# Patient Record
Sex: MALE
[De-identification: names, ages, dates, MRNs, and addresses within clinical notes are randomized per-mention and may not be internally consistent; named-entity substitution may affect disease eponyms.]

---

## 2017-01-01 ENCOUNTER — HOSPITAL ENCOUNTER (EMERGENCY)
Dept: HOSPITAL 72 - EMR | Age: 82
Discharge: NURSING, REHAB OR LONG-TERM CARE | End: 2017-01-01
Payer: MEDICARE

## 2017-01-01 VITALS — HEIGHT: 67 IN | BODY MASS INDEX: 18.83 KG/M2 | WEIGHT: 120 LBS

## 2017-01-01 VITALS — SYSTOLIC BLOOD PRESSURE: 131 MMHG | DIASTOLIC BLOOD PRESSURE: 66 MMHG

## 2017-01-01 VITALS — SYSTOLIC BLOOD PRESSURE: 132 MMHG | DIASTOLIC BLOOD PRESSURE: 65 MMHG

## 2017-01-01 DIAGNOSIS — F03.90: ICD-10-CM

## 2017-01-01 DIAGNOSIS — I25.119: ICD-10-CM

## 2017-01-01 DIAGNOSIS — R53.1: ICD-10-CM

## 2017-01-01 DIAGNOSIS — I10: ICD-10-CM

## 2017-01-01 DIAGNOSIS — K94.23: Primary | ICD-10-CM

## 2017-01-01 DIAGNOSIS — D64.9: ICD-10-CM
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
## 2017-01-01 DIAGNOSIS — R13.10: ICD-10-CM

## 2017-01-01 DIAGNOSIS — Z86.73: ICD-10-CM

## 2017-01-01 DIAGNOSIS — M48.00: ICD-10-CM

## 2017-01-01 PROCEDURE — 43760: CPT

## 2017-01-01 PROCEDURE — 74000: CPT

## 2017-01-01 NOTE — DIAGNOSTIC IMAGING REPORT
Indication: Post gastrostomy replacement



Technique: Supine view of the abdomen after injection of water-soluble contrast 

into

gastrostomy



Comparison: 12/16/2016



Findings: Contrast opacifies the stomach. No contrast extravasation is demonstrated.

The bowel gas pattern is unremarkable. Dense stool is seen within the colon.

Findings are unchanged



Impression: Satisfactory position of gastrostomy tube

## 2017-11-02 ENCOUNTER — HOSPITAL ENCOUNTER (EMERGENCY)
Dept: HOSPITAL 72 - EMR | Age: 82
Discharge: HOME | End: 2017-11-02
Payer: MEDICARE

## 2017-11-02 VITALS — SYSTOLIC BLOOD PRESSURE: 109 MMHG | DIASTOLIC BLOOD PRESSURE: 85 MMHG

## 2017-11-02 VITALS — DIASTOLIC BLOOD PRESSURE: 58 MMHG | SYSTOLIC BLOOD PRESSURE: 102 MMHG

## 2017-11-02 VITALS — HEIGHT: 67 IN | WEIGHT: 150 LBS | BODY MASS INDEX: 23.54 KG/M2

## 2017-11-02 DIAGNOSIS — F03.90: ICD-10-CM

## 2017-11-02 DIAGNOSIS — Z86.73: ICD-10-CM

## 2017-11-02 DIAGNOSIS — I25.10: ICD-10-CM

## 2017-11-02 DIAGNOSIS — K94.23: Primary | ICD-10-CM

## 2017-11-02 DIAGNOSIS — R13.10: ICD-10-CM
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
## 2017-11-02 DIAGNOSIS — I10: ICD-10-CM

## 2017-11-02 PROCEDURE — 74000: CPT

## 2017-11-02 PROCEDURE — 99284 EMERGENCY DEPT VISIT MOD MDM: CPT

## 2017-11-02 PROCEDURE — 43760: CPT

## 2017-11-02 NOTE — EMERGENCY ROOM REPORT
History of Present Illness


General


Chief Complaint:  Malfunctioning Gastric Tube


Source:  Patient





Present Illness


HPI


83-year-old male presents ED for G-tube placement.  G-tube came out nursing 

home today.  On arrival patient showing no signs of distress.  Patient has 

dementia and is unable to provide any history at this time.  No other 

aggravating or relieving factors.  No other associated symptoms


Allergies:  


Coded Allergies:  


     NO KNOWN DRUG ALLERGIES (Unverified  Allergy, Unknown, 1/28/16)





Patient History


Past Medical History:  CAD, CVA/TIA


Past Surgical History:  other - gtube


Pertinent Family History:  none


Social History:  Denies: smoking, alcohol use, drug use


Immunizations:  UTD


Reviewed Nursing Documentation:  PMH: Agreed, PSxH: Agreed





Nursing Documentation-PMH


Hx Cardiac Problems:  Yes - anemia, angina,CAD


Hx Hypertension:  Yes


Hx Pacemaker:  No


Hx Asthma:  No


Hx COPD:  No


Hx Diabetes:  No


Hx Cancer:  No


Hx Gastrointestinal Problems:  Yes - Dysphagia, G-tube


Hx Dialysis:  No


Hx Neurological Problems:  Yes


Hx Cerebrovascular Accident:  Yes - TIA


Hx Transient Ischemic Attacks:  Yes - no residual


Hx Dementia:  Yes


Hx Seizures:  No


Hx Spinal Cord Injury:  Yes - spinal stenosis


Hx Dysphasia:  Yes


Hx Weakness:  Yes - generalized muscle weakness


Hx Neurologic Surgery:  No


Hx Brain Shunt:  No





Review of Systems


All Other Systems:  negative except mentioned in HPI





Physical Exam





Vital Signs








  Date Time  Temp Pulse Resp B/P (MAP) Pulse Ox O2 Delivery O2 Flow Rate FiO2


 


11/2/17 09:48 97.0 68 16  98 Room Air  


 


11/2/17 10:23    109/85    








Sp02 EP Interpretation:  reviewed, normal


General Appearance:  other - dementia


Head:  normocephalic


Eyes:  bilateral eye normal inspection, bilateral eye PERRL


ENT:  normal ENT inspection


Neck:  normal inspection


Respiratory:  normal inspection


Cardiovascular #1:  normal inspection


Gastrointestinal:  normal inspection, other - Gtube site C/D/I


Rectal:  deferred


Genitourinary:  no CVA tenderness


Musculoskeletal:  normal inspection


Neurologic:  other - dementia


Psychiatric:  other - dementia


Skin:  normal inspection


Lymphatic:  normal inspection





Procedures


Additional Procedure


Procedure Narrative


G-tube placement





Patient placed on stretcher. Old G-tube is removed by deflating the balloon 

using syringe.  G-tube site is inspected with no contraindications to G-tube 

placement.  G-tube slowly inserted until resistance is met; G-tube balloon is 

slowly filled with 20 mL of normal saline and slowly retracted back until 

resistance is met.  G-tube placement is confirmed with KUB study using 

Gastrografin





Medical Decision Making


Diagnostic Impression:  


 Primary Impression:  


 Malfunction of gastrostomy tube


ER Course


Hospital Course 


83-year-old male presents to ED for G-tube placement.





Clinical course


Patient placed on stretcher.  After initial history and physical I replaced G-

tube and inflate the balloon.  G-tube placement confirmed with KUB study.  

Patient remained stable without any signs of distress.  Nursing home called and 

patient subsequently discharged back to facility.





Dr Fouladin made aware that G-tube was successfully replaced and patient return 

to facility





Diagnosis - malfunction of G tube 





stable and discharged back to facility.  Followup with PMD.  Return to ED if 

symptoms recur or worsen


Other X-Ray Diagnostic Results


Other X-Ray Diagnostic Results :  


   X-Ray ordered:  KUB


   # of Views/Limited Vs Complete:  1 View


   Indication:  Other - gtube placement


   EP Interpretation:  Yes


   Interpretation:  nonspecific bowel gas, no sbo, other - gtube in place


   Impression:  Other - gtube in place


   Electronically Signed by:  Electronically signed by Devin Armenta MD





Last Vital Signs








  Date Time  Temp Pulse Resp B/P (MAP) Pulse Ox O2 Delivery O2 Flow Rate FiO2


 


11/2/17 12:57  72 18 102/58 100 Room Air  


 


11/2/17 10:23 97.0       








Status:  improved


Disposition:  HOME, SELF-CARE


Condition:  Stable


Referrals:  


LEORA SARMIENTO (PCP)


Patient Instructions:  Gastrostomy Tube Home Guide, Adult











DEVIN ARMENTA M.D. Nov 2, 2017 14:16

## 2017-11-02 NOTE — DIAGNOSTIC IMAGING REPORT
Indication: Status post gastrostomy replacement



Technique: Supine view of the abdomen after injection of water-soluble contrast 

into

gastrostomy



Comparison: 1/1/2017



Findings: Contrast opacifies the duodenum distal to the bulb. No gastric 

luminal

opacification is demonstrated, so physicians less inflation of the balloon 

recess.

However, on both image sets, contrast flows directly into the second portion of 

the

duodenum, indicating good position of the tip, there is no contrast extravasation.





Impression: Presumed status position of gastrostomy tube



Findings previously discussed by phone with Dr. Armenta in the emergency room

## 2017-11-09 ENCOUNTER — HOSPITAL ENCOUNTER (INPATIENT)
Dept: HOSPITAL 72 - EMR | Age: 82
LOS: 5 days | Discharge: NURSING, REHAB OR LONG-TERM CARE | DRG: 178 | End: 2017-11-14
Payer: MEDICARE

## 2017-11-09 VITALS — HEIGHT: 64 IN | BODY MASS INDEX: 18.78 KG/M2 | WEIGHT: 110 LBS

## 2017-11-09 VITALS — SYSTOLIC BLOOD PRESSURE: 118 MMHG | DIASTOLIC BLOOD PRESSURE: 62 MMHG

## 2017-11-09 VITALS — DIASTOLIC BLOOD PRESSURE: 66 MMHG | SYSTOLIC BLOOD PRESSURE: 121 MMHG

## 2017-11-09 VITALS — DIASTOLIC BLOOD PRESSURE: 66 MMHG | SYSTOLIC BLOOD PRESSURE: 122 MMHG

## 2017-11-09 VITALS — SYSTOLIC BLOOD PRESSURE: 135 MMHG | DIASTOLIC BLOOD PRESSURE: 74 MMHG

## 2017-11-09 VITALS — SYSTOLIC BLOOD PRESSURE: 103 MMHG | DIASTOLIC BLOOD PRESSURE: 63 MMHG

## 2017-11-09 DIAGNOSIS — E46: ICD-10-CM

## 2017-11-09 DIAGNOSIS — I10: ICD-10-CM

## 2017-11-09 DIAGNOSIS — F03.90: ICD-10-CM

## 2017-11-09 DIAGNOSIS — E86.0: ICD-10-CM

## 2017-11-09 DIAGNOSIS — R31.0: ICD-10-CM

## 2017-11-09 DIAGNOSIS — L89.892: ICD-10-CM

## 2017-11-09 DIAGNOSIS — R13.10: ICD-10-CM

## 2017-11-09 DIAGNOSIS — Z43.1: ICD-10-CM

## 2017-11-09 DIAGNOSIS — J69.0: Primary | ICD-10-CM

## 2017-11-09 DIAGNOSIS — E03.9: ICD-10-CM

## 2017-11-09 DIAGNOSIS — F09: ICD-10-CM

## 2017-11-09 DIAGNOSIS — R40.3: ICD-10-CM

## 2017-11-09 DIAGNOSIS — N39.0: ICD-10-CM

## 2017-11-09 DIAGNOSIS — Z66: ICD-10-CM

## 2017-11-09 DIAGNOSIS — K92.2: ICD-10-CM

## 2017-11-09 DIAGNOSIS — B96.4: ICD-10-CM

## 2017-11-09 DIAGNOSIS — E87.0: ICD-10-CM

## 2017-11-09 DIAGNOSIS — L89.891: ICD-10-CM

## 2017-11-09 DIAGNOSIS — I25.10: ICD-10-CM

## 2017-11-09 LAB
ALBUMIN/GLOB SERPL: 0.6 {RATIO} (ref 1–2.7)
ALT SERPL-CCNC: 75 U/L (ref 12–78)
ANION GAP SERPL CALC-SCNC: 44 MMOL/L (ref 5–15)
ANISOCYTOSIS BLD QL SMEAR: (no result)
APPEARANCE UR: (no result)
APTT BLD: 31 SEC (ref 23–33)
AST SERPL-CCNC: 77 U/L (ref 15–37)
BACTERIA #/AREA URNS HPF: (no result) /HPF
BASOPHILS NFR BLD MANUAL: 0 % (ref 0–2)
CALCIUM SERPL-MCNC: 8 MG/DL (ref 8.5–10.1)
CHLORIDE SERPL-SCNC: 87 MMOL/L (ref 98–107)
CK MB SERPL-MCNC: 1.5 NG/ML (ref 0–3.6)
CO2 SERPL-SCNC: 20 MMOL/L (ref 21–32)
CREAT SERPL-MCNC: 0.7 MG/DL (ref 0.55–1.3)
EOSINOPHIL NFR BLD MANUAL: 0 % (ref 0–3)
ERYTHROCYTE [DISTWIDTH] IN BLOOD BY AUTOMATED COUNT: 11.5 % (ref 11.6–14.8)
GLOBULIN SER-MCNC: 4.1 G/DL
INR PPP: 1.1 (ref 0.9–1.1)
KETONES UR QL STRIP: (no result)
LEUKOCYTE ESTERASE UR QL STRIP: (no result)
LIPASE SERPL-CCNC: 84 U/L (ref 73–393)
MACROCYTES: (no result)
MCH RBC QN AUTO: 32.5 PG (ref 27–31)
MCHC RBC AUTO-ENTMCNC: 31.7 G/DL (ref 32–36)
MCV RBC AUTO: 103 FL (ref 80–99)
NEUTS BAND NFR BLD MANUAL: 5 % (ref 0–8)
NEUTS BAND NFR BLD MANUAL: 83 % (ref 45–75)
NITRITE UR QL STRIP: POSITIVE
PH UR STRIP: 7 [PH] (ref 4.5–8)
PLAT MORPH BLD: NORMAL
PLATELET # BLD EST: ADEQUATE 10*3/UL
PLATELET # BLD: 209 K/UL (ref 150–450)
PMV BLD AUTO: 7.6 FL (ref 6.5–10.1)
POTASSIUM SERPL-SCNC: 3.6 MMOL/L (ref 3.5–5.1)
PROT SERPL-MCNC: 6.7 G/DL (ref 6.4–8.2)
PROT UR QL STRIP: (no result)
PROTHROMBIN TIME: 11 SEC (ref 9.3–11.5)
RBC # BLD AUTO: 3.97 M/UL (ref 4.7–6.1)
RBC #/AREA URNS HPF: (no result) /HPF (ref 0–0)
SODIUM SERPL-SCNC: 151 MMOL/L (ref 136–145)
SP GR UR STRIP: 1.01 (ref 1–1.03)
SQUAMOUS #/AREA URNS LPF: (no result) /LPF
TOTAL CELLS COUNTED BLD: 100
UROBILINOGEN UR-MCNC: 4 MG/DL (ref 0–1)
VARIANT LYMPHS NFR BLD MANUAL: 6 % (ref 20–45)
WBC # BLD AUTO: 7.7 K/UL (ref 4.8–10.8)
WBC #/AREA URNS HPF: (no result) /HPF (ref 0–0)

## 2017-11-09 PROCEDURE — 80053 COMPREHEN METABOLIC PANEL: CPT

## 2017-11-09 PROCEDURE — 83880 ASSAY OF NATRIURETIC PEPTIDE: CPT

## 2017-11-09 PROCEDURE — 84550 ASSAY OF BLOOD/URIC ACID: CPT

## 2017-11-09 PROCEDURE — 84443 ASSAY THYROID STIM HORMONE: CPT

## 2017-11-09 PROCEDURE — 94760 N-INVAS EAR/PLS OXIMETRY 1: CPT

## 2017-11-09 PROCEDURE — 80061 LIPID PANEL: CPT

## 2017-11-09 PROCEDURE — 85730 THROMBOPLASTIN TIME PARTIAL: CPT

## 2017-11-09 PROCEDURE — 82607 VITAMIN B-12: CPT

## 2017-11-09 PROCEDURE — 83690 ASSAY OF LIPASE: CPT

## 2017-11-09 PROCEDURE — 83735 ASSAY OF MAGNESIUM: CPT

## 2017-11-09 PROCEDURE — 82553 CREATINE MB FRACTION: CPT

## 2017-11-09 PROCEDURE — 82977 ASSAY OF GGT: CPT

## 2017-11-09 PROCEDURE — 82550 ASSAY OF CK (CPK): CPT

## 2017-11-09 PROCEDURE — 86140 C-REACTIVE PROTEIN: CPT

## 2017-11-09 PROCEDURE — 81001 URINALYSIS AUTO W/SCOPE: CPT

## 2017-11-09 PROCEDURE — 84484 ASSAY OF TROPONIN QUANT: CPT

## 2017-11-09 PROCEDURE — 85007 BL SMEAR W/DIFF WBC COUNT: CPT

## 2017-11-09 PROCEDURE — 36415 COLL VENOUS BLD VENIPUNCTURE: CPT

## 2017-11-09 PROCEDURE — 85025 COMPLETE CBC W/AUTO DIFF WBC: CPT

## 2017-11-09 PROCEDURE — 85610 PROTHROMBIN TIME: CPT

## 2017-11-09 PROCEDURE — 87181 SC STD AGAR DILUTION PER AGT: CPT

## 2017-11-09 PROCEDURE — 94664 DEMO&/EVAL PT USE INHALER: CPT

## 2017-11-09 PROCEDURE — 71010: CPT

## 2017-11-09 PROCEDURE — 84100 ASSAY OF PHOSPHORUS: CPT

## 2017-11-09 PROCEDURE — 82248 BILIRUBIN DIRECT: CPT

## 2017-11-09 PROCEDURE — 99285 EMERGENCY DEPT VISIT HI MDM: CPT

## 2017-11-09 PROCEDURE — 81003 URINALYSIS AUTO W/O SCOPE: CPT

## 2017-11-09 PROCEDURE — 82746 ASSAY OF FOLIC ACID SERUM: CPT

## 2017-11-09 PROCEDURE — 87086 URINE CULTURE/COLONY COUNT: CPT

## 2017-11-09 PROCEDURE — 93005 ELECTROCARDIOGRAM TRACING: CPT

## 2017-11-09 PROCEDURE — 94640 AIRWAY INHALATION TREATMENT: CPT

## 2017-11-09 RX ADMIN — ALBUTEROL SULFATE SCH MG: 2.5 SOLUTION RESPIRATORY (INHALATION) at 19:33

## 2017-11-09 RX ADMIN — SODIUM CHLORIDE SCH MLS/HR: 900 INJECTION, SOLUTION INTRAVENOUS at 18:22

## 2017-11-09 RX ADMIN — HEPARIN SODIUM SCH UNITS: 5000 INJECTION INTRAVENOUS; SUBCUTANEOUS at 21:22

## 2017-11-09 RX ADMIN — METOPROLOL TARTRATE SCH MG: 25 TABLET, FILM COATED ORAL at 21:20

## 2017-11-09 RX ADMIN — PANTOPRAZOLE SODIUM SCH MG: 40 INJECTION, POWDER, FOR SOLUTION INTRAVENOUS at 21:19

## 2017-11-09 NOTE — EMERGENCY ROOM REPORT
History of Present Illness


General


Chief Complaint:  Gastrointestinal Bleed


Source:  Patient, Medical Record





Present Illness


HPI


Patient present from nursing facility with reports of coffee-ground emesis


Patient has been weaker than usual





There was no reports of diarrhea


Patient has a feeding tube in place





Patient himself is nonverbal


History of present illness is limited





There was no reports of fevers or chills


No rash reported


Allergies:  


Coded Allergies:  


     NO KNOWN DRUG ALLERGIES (Unverified  Allergy, Unknown, 1/28/16)





Patient History


Limited by:  medical condition


Past Medical History:  see triage record


Pertinent Family History:  none, unable to obtain


Reviewed Nursing Documentation:  PMH: Agreed, PSxH: Agreed





Nursing Documentation-PMH


Past Medical History:  No History, Except For


Hx Cardiac Problems:  Yes - anemia, angina,CAD


Hx Hypertension:  Yes


Hx Pacemaker:  No


Hx Asthma:  No


Hx COPD:  No


Hx Diabetes:  No


Hx Cancer:  No


Hx Gastrointestinal Problems:  Yes - Dysphagia, G-tube


Hx Dialysis:  No


Hx Neurological Problems:  Yes


Hx Cerebrovascular Accident:  Yes - TIA


Hx Transient Ischemic Attacks:  Yes - no residual


Hx Dementia:  Yes


Hx Seizures:  No


Hx Spinal Cord Injury:  Yes - spinal stenosis


Hx Dysphasia:  Yes


Hx Weakness:  Yes - generalized muscle weakness


Hx Neurologic Surgery:  No


Hx Brain Shunt:  No





Review of Systems


All Other Systems:  limited - Other than the ones mentioned in the history of 

present illness all others are reviewed however they do stay limited due to the 

patient's mental status





Physical Exam





Vital Signs








  Date Time  Temp Pulse Resp B/P (MAP) Pulse Ox O2 Delivery O2 Flow Rate FiO2


 


11/9/17 10:14 97.5 108 18 107/51 94 Room Air  


 


11/9/17 11:13       4.0 








Sp02 EP Interpretation:  reviewed, normal


General Appearance:  cachetic - weak


Head:  normocephalic, atraumatic


Eyes:  bilateral eye PERRL


ENT:  dry mucus membranes


Neck:  supple, thyroid normal


Respiratory:  crackles - diffusely lower lobes


Cardiovascular #1:  regular rate, rhythm


Gastrointestinal:  non tender, soft, other - Feeding tube in place


Musculoskeletal:  other - Patient is chronically debilitated legs are flexed 

not following commands


Neurologic:  responsive - To physical stimuli


Skin:  no rash


Lymphatic:  no adenopathy





Medical Decision Making


Diagnostic Impression:  


 Primary Impression:  


 Hypernatremia


 Additional Impressions:  


 Hypochloremia


 Dehydration


 UTI (urinary tract infection)


ER Course


Patient is complex with multiple differentials considered





At this time showing signs of electrolyte abnormality


Patient also has concern of possible GI bleeding


Patient has evidence of UTI, antibiotic was deferred to infectious disease as 

there has been multiple assistants previously





Patient's further hydrated


Requires inpatient care and evaluation





Labs








Test


  11/9/17


10:30 11/9/17


12:20 11/9/17


14:01


 


White Blood Count


  7.7 K/UL


(4.8-10.8) 


  


 


 


Red Blood Count


  3.97 M/UL


(4.70-6.10) 


  


 


 


Hemoglobin


  12.9 G/DL


(14.2-18.0) 


  


 


 


Hematocrit


  40.7 %


(42.0-52.0) 


  


 


 


Mean Corpuscular Volume 103 FL (80-99)   


 


Mean Corpuscular Hemoglobin


  32.5 PG


(27.0-31.0) 


  


 


 


Mean Corpuscular Hemoglobin


Concent 31.7 G/DL


(32.0-36.0) 


  


 


 


Red Cell Distribution Width


  11.5 %


(11.6-14.8) 


  


 


 


Platelet Count


  209 K/UL


(150-450) 


  


 


 


Mean Platelet Volume


  7.6 FL


(6.5-10.1) 


  


 


 


Neutrophils (%) (Auto)  % (45.0-75.0)   


 


Lymphocytes (%) (Auto)  % (20.0-45.0)   


 


Monocytes (%) (Auto)  % (1.0-10.0)   


 


Eosinophils (%) (Auto)  % (0.0-3.0)   


 


Basophils (%) (Auto)  % (0.0-2.0)   


 


Differential Total Cells


Counted 100 


  


  


 


 


Neutrophils % (Manual) 83 % (45-75)   


 


Lymphocytes % (Manual) 6 % (20-45)   


 


Monocytes % (Manual) 6 % (1-10)   


 


Eosinophils % (Manual) 0 % (0-3)   


 


Basophils % (Manual) 0 % (0-2)   


 


Band Neutrophils 5 % (0-8)   


 


Platelet Estimate Adequate   


 


Platelet Morphology Normal   


 


Anisocytosis 1+   


 


Macrocytosis 1+   


 


Sodium Level


  151 MMOL/L


(136-145) 


  


 


 


Potassium Level


  3.6 MMOL/L


(3.5-5.1) 


  


 


 


Chloride Level


  87 MMOL/L


() 


  


 


 


Carbon Dioxide Level


  20 MMOL/L


(21-32) 


  


 


 


Anion Gap


  44 mmol/L


(5-15) 


  


 


 


Blood Urea Nitrogen


  21 mg/dL


(7-18) 


  


 


 


Creatinine


  0.7 MG/DL


(0.55-1.30) 


  


 


 


Estimat Glomerular Filtration


Rate  mL/min (>60) 


  


  


 


 


Glucose Level


  116 MG/DL


() 


  


 


 


Calcium Level


  8.0 MG/DL


(8.5-10.1) 


  


 


 


Total Bilirubin


  0.7 MG/DL


(0.2-1.0) 


  


 


 


Aspartate Amino Transf


(AST/SGOT) 77 U/L (15-37) 


  


  


 


 


Alanine Aminotransferase


(ALT/SGPT) 75 U/L (12-78) 


  


  


 


 


Alkaline Phosphatase


  103 U/L


() 


  


 


 


Total Creatine Kinase


  48 U/L


() 


  


 


 


Creatine Kinase MB


  1.5 NG/ML


(0.0-3.6) 


  


 


 


Creatine Kinase MB Relative


Index 3.1 


  


  


 


 


Troponin I


  0.018 ng/mL


(0.000-0.056) 


  


 


 


Pro-B-Type Natriuretic Peptide


  462 pg/mL


(0-125) 


  


 


 


Total Protein


  6.7 G/DL


(6.4-8.2) 


  


 


 


Albumin


  2.6 G/DL


(3.4-5.0) 


  


 


 


Globulin 4.1 g/dL   


 


Albumin/Globulin Ratio 0.6 (1.0-2.7)   


 


Lipase


  84 U/L


() 


  


 


 


Prothrombin Time


  


  11.0 SEC


(9.30-11.50) 


 


 


Prothromb Time International


Ratio 


  1.1 (0.9-1.1) 


  


 


 


Activated Partial


Thromboplast Time 


  31 SEC (23-33) 


  


 


 


Urine Color   Yellow 


 


Urine Appearance


  


  


  Slightly


cloudy


 


Urine pH   7 (4.5-8.0) 


 


Urine Specific Gravity


  


  


  1.010


(1.005-1.035)


 


Urine Protein   2+ (NEGATIVE) 


 


Urine Glucose (UA)


  


  


  Negative


(NEGATIVE)


 


Urine Ketones   1+ (NEGATIVE) 


 


Urine Occult Blood   5+ (NEGATIVE) 


 


Urine Nitrite


  


  


  Positive


(NEGATIVE)


 


Urine Bilirubin


  


  


  Negative


(NEGATIVE)


 


Urine Urobilinogen


  


  


  4 MG/DL


(0.0-1.0)


 


Urine Leukocyte Esterase   3+ (NEGATIVE) 


 


Urine RBC


  


  


  20-30 /HPF (0


- 0)


 


Urine WBC


  


  


  5-10 /HPF (0 -


0)


 


Urine Squamous Epithelial


Cells 


  


  Occasional


/LPF


 


Urine Bacteria


  


  


  Many /HPF


(NONE)








Rhythm Strip Diag. Results


EP Interpretation:  yes


Rate:  78


Rhythm:  NSR, no PVC's, no ectopy





Chest X-Ray Diagnostic Results


Chest X-Ray Diagnostic Results :  


   Chest X-Ray Ordered:  Yes


   # of Views/Limited/Complete:  1 View


   Indication:  Shortness of Breath


   EP Interpretation:  Yes


   Interpretation:  no pneumothorax, other - Heart size normal, small effusion 

difficult exam,


   Impression:  Other - small effusion


   Electronically Signed by:  Joey Currie DO





Last Vital Signs








  Date Time  Temp Pulse Resp B/P (MAP) Pulse Ox O2 Delivery O2 Flow Rate FiO2


 


11/9/17 11:30 100.0  21 121/66 94 Simple Mask 4.0 


 


11/9/17 11:13  106      








Status:  improved


Disposition:  ADMITTED AS INPATIENT


Condition:  Serious


Referrals:  


LEORA SARMIENTO (PCP)











JOEY CURRIE D.O. Nov 9, 2017 13:46

## 2017-11-09 NOTE — HISTORY & PHYSICAL
History and Physical


History & Physicial





coffee ground vomitus


PEG


UTI


Dehydration , High Na


MalNutrition





Hydrate-


Protonix IV


Urine c/s


[facility]





# [id number]











LEORA SARMIENTO Nov 9, 2017 16:39

## 2017-11-09 NOTE — CONSULTATION
History of Present Illness


General


Date patient seen:  Nov 9, 2017


Chief Complaint:  Gastrointestinal Bleed


Reason for Consultation:  inpatient management





Present Illness


HPI


83 year old male with end stage dementia, Bed bound, cachectic with Gtube,  

presented from nursing facility with reports of coffee-ground emesis


Patient has been weaker than usual. Patient himself is nonverbal.  Pt looks 

chronically ill, and can't give any history.


Allergies:  


Coded Allergies:  


     NO KNOWN DRUG ALLERGIES (Unverified  Allergy, Unknown, 1/28/16)





Medication History


Scheduled


Petrolatum,White/Lanolin (Vitamin A & D Ointment), 113 GM TP DAILY, (Reported)





Scheduled PRN


Acetaminophen 160MG/5ML* (Acetaminophen*), 20 ML ORAL Q6HR PRN for For Pain, (

Reported)


Albuterol Sulfate* (Albuterol Sulfate Hhn*), 3 ML INH Q6H PRN for Shortness of 

Breath, (Reported)





Patient History


Healthcare decision maker





Resuscitation status


Do Not Resuscitate


Advanced Directive on File


Yes





Past Medical/Surgical History


Past Medical/Surgical History:  


(1) S/P percutaneous endoscopic gastrostomy (PEG) tube placement


(2) Dementia


(3) CAD (coronary artery disease)


(4) HTN (hypertension)





Review of Systems


Constitutional:  Reports: no symptoms


Eye:  Reports: no symptoms





Physical Exam


General Appearance:  cachetic


Lines, tubes and drains:  peripheral


HEENT:  normocephalic, atraumatic


Neck:  non-tender, normal alignment


Respiratory/Chest:  chest wall non-tender, lungs clear


Breasts:  no masses


Cardiovascular/Chest:  normal peripheral pulses


Abdomen:  normal bowel sounds, soft


Genitourinary/Rectal:  normal genital exam


Extremities:  normal range of motion





Last 24 Hour Vital Signs








  Date Time  Temp Pulse Resp B/P (MAP) Pulse Ox O2 Delivery O2 Flow Rate FiO2


 


11/9/17 18:15 98.1 93 19 103/63  Nasal Cannula 4.0 


 


11/9/17 16:00  89      


 


11/9/17 11:30 100.0  21 121/66 94 Simple Mask 4.0 


 


11/9/17 11:13 97.5 106 18 122/66 93 Nasal Cannula 4.0 


 


11/9/17 10:14 97.5 108 18 107/51 94 Room Air  

















Intake and Output  


 


 11/9/17 11/10/17





 19:00 07:00


 


Output Total 150 ml 


 


Balance -150 ml 


 


  


 


Output Urine Total 150 ml 


 


# Voids 1 


 


# Bowel Movements 1 











Laboratory Tests








Test


  11/9/17


10:30 11/9/17


12:20 11/9/17


14:01


 


White Blood Count


  7.7 K/UL


(4.8-10.8) 


  


 


 


Red Blood Count


  3.97 M/UL


(4.70-6.10)  L 


  


 


 


Hemoglobin


  12.9 G/DL


(14.2-18.0)  L 


  


 


 


Hematocrit


  40.7 %


(42.0-52.0)  L 


  


 


 


Mean Corpuscular Volume


  103 FL (80-99)


H 


  


 


 


Mean Corpuscular Hemoglobin


  32.5 PG


(27.0-31.0)  H 


  


 


 


Mean Corpuscular Hemoglobin


Concent 31.7 G/DL


(32.0-36.0)  L 


  


 


 


Red Cell Distribution Width


  11.5 %


(11.6-14.8)  L 


  


 


 


Platelet Count


  209 K/UL


(150-450) 


  


 


 


Mean Platelet Volume


  7.6 FL


(6.5-10.1) 


  


 


 


Neutrophils (%) (Auto)


  % (45.0-75.0)


  


  


 


 


Lymphocytes (%) (Auto)


  % (20.0-45.0)


  


  


 


 


Monocytes (%) (Auto)  % (1.0-10.0)    


 


Eosinophils (%) (Auto)  % (0.0-3.0)    


 


Basophils (%) (Auto)  % (0.0-2.0)    


 


Differential Total Cells


Counted 100  


  


  


 


 


Neutrophils % (Manual) 83 % (45-75)  H  


 


Lymphocytes % (Manual) 6 % (20-45)  L  


 


Monocytes % (Manual) 6 % (1-10)    


 


Eosinophils % (Manual) 0 % (0-3)    


 


Basophils % (Manual) 0 % (0-2)    


 


Band Neutrophils 5 % (0-8)    


 


Platelet Estimate Adequate    


 


Platelet Morphology Normal    


 


Anisocytosis 1+    


 


Macrocytosis 1+    


 


Sodium Level


  151 MMOL/L


(136-145)  H 


  


 


 


Potassium Level


  3.6 MMOL/L


(3.5-5.1) 


  


 


 


Chloride Level


  87 MMOL/L


()  L 


  


 


 


Carbon Dioxide Level


  20 MMOL/L


(21-32)  L 


  


 


 


Anion Gap


  44 mmol/L


(5-15)  H 


  


 


 


Blood Urea Nitrogen


  21 mg/dL


(7-18)  H 


  


 


 


Creatinine


  0.7 MG/DL


(0.55-1.30) 


  


 


 


Estimat Glomerular Filtration


Rate  mL/min (>60)  


  


  


 


 


Glucose Level


  116 MG/DL


()  H 


  


 


 


Calcium Level


  8.0 MG/DL


(8.5-10.1)  L 


  


 


 


Total Bilirubin


  0.7 MG/DL


(0.2-1.0) 


  


 


 


Aspartate Amino Transf


(AST/SGOT) 77 U/L (15-37)


H 


  


 


 


Alanine Aminotransferase


(ALT/SGPT) 75 U/L (12-78)


  


  


 


 


Alkaline Phosphatase


  103 U/L


() 


  


 


 


Total Creatine Kinase


  48 U/L


() 


  


 


 


Creatine Kinase MB


  1.5 NG/ML


(0.0-3.6) 


  


 


 


Creatine Kinase MB Relative


Index 3.1  


  


  


 


 


Troponin I


  0.018 ng/mL


(0.000-0.056) 


  


 


 


Pro-B-Type Natriuretic Peptide


  462 pg/mL


(0-125)  H 


  


 


 


Total Protein


  6.7 G/DL


(6.4-8.2) 


  


 


 


Albumin


  2.6 G/DL


(3.4-5.0)  L 


  


 


 


Globulin 4.1 g/dL    


 


Albumin/Globulin Ratio


  0.6 (1.0-2.7)


L 


  


 


 


Lipase


  84 U/L


() 


  


 


 


Prothrombin Time


  


  11.0 SEC


(9.30-11.50) 


 


 


Prothromb Time International


Ratio 


  1.1 (0.9-1.1)  


  


 


 


Activated Partial


Thromboplast Time 


  31 SEC (23-33)


  


 


 


Urine Color   Yellow  


 


Urine Appearance


  


  


  Slightly


cloudy


 


Urine pH   7 (4.5-8.0)  


 


Urine Specific Gravity


  


  


  1.010


(1.005-1.035)


 


Urine Protein


  


  


  2+ (NEGATIVE)


H


 


Urine Glucose (UA)


  


  


  Negative


(NEGATIVE)


 


Urine Ketones


  


  


  1+ (NEGATIVE)


H


 


Urine Occult Blood


  


  


  5+ (NEGATIVE)


H


 


Urine Nitrite


  


  


  Positive


(NEGATIVE)  H


 


Urine Bilirubin


  


  


  Negative


(NEGATIVE)


 


Urine Urobilinogen


  


  


  4 MG/DL


(0.0-1.0)  H


 


Urine Leukocyte Esterase


  


  


  3+ (NEGATIVE)


H


 


Urine RBC


  


  


  20-30 /HPF (0


- 0)  H


 


Urine WBC


  


  


  5-10 /HPF (0 -


0)  H


 


Urine Squamous Epithelial


Cells 


  


  Occasional


/LPF


 


Urine Bacteria


  


  


  Many /HPF


(NONE)  H








Height (Feet):  5


Height (Inches):  4.00


Weight (Pounds):  110


Medications





Current Medications








 Medications


  (Trade)  Dose


 Ordered  Sig/Benita


 Route


 PRN Reason  Start Time


 Stop Time Status Last Admin


Dose Admin


 


 Albuterol Sulfate


  (Proventil)  2.5 mg  TIDRT


 HHN


   11/9/17 19:00


 11/14/17 18:59   


 


 


 Ceftriaxone


 Sodium 1 gm/


 Dextrose  55 ml @ 


 110 mls/hr  Q24H


 IVPB


   11/9/17 18:00


 11/16/17 17:59  11/9/17 18:22


 


 


 Haloperidol


 Lactate


  (Haldol)  2 mg  Q4H  PRN


 IM


 Agitation  11/9/17 17:00


 12/9/17 16:59   


 


 


 Heparin Sodium


  (Porcine)


  (Heparin 5000


 units/ml)  5,000 units  EVERY 12  HOURS


 SUBQ


   11/9/17 21:00


 12/9/17 20:59   


 


 


 Metoprolol


 Tartrate


  (Lopressor)  12.5 mg  Q12HR


 GT


   11/9/17 21:00


 12/9/17 20:59   


 


 


 Pantoprazole


  (Protonix)  40 mg  EVERY 12  HOURS


 IVP


   11/9/17 21:00


 12/9/17 20:59   


 


 


 Potassium


 Chloride 10 meq/


 Dextrose/Sodium


 Chloride  1,005 ml @ 


 75 mls/hr  [id number]


 IV


   11/9/17 18:15


 12/9/17 18:14  11/9/17 18:22


 











Assessment/Plan


Problem List:  


(1) Gastrointestinal hemorrhage


ICD Codes:  K92.2 - Gastrointestinal hemorrhage, unspecified


SNOMED:  50066786


(2) S/P percutaneous endoscopic gastrostomy (PEG) tube placement


ICD Codes:  Z93.1 - Gastrostomy status


SNOMED:  565436607


(3) Dementia


ICD Codes:  F03.90 - Unspecified dementia without behavioral disturbance


SNOMED:  09500447


(4) CAD (coronary artery disease)


ICD Codes:  I25.10 - Atherosclerotic heart disease of native coronary artery 

without angina pectoris


SNOMED:  03643919


(5) HTN (hypertension)


ICD Codes:  I10 - Essential (primary) hypertension


SNOMED:  53304341


(6) Hypernatremia


ICD Codes:  E87.0 - Hyperosmolality and hypernatremia


SNOMED:  63607696


Assessment/Plan


NPO


IV fluids


check electrolytes


GI evaluation


dvt prophylaxis











DONALD HAND Nov 9, 2017 19:10

## 2017-11-09 NOTE — DIAGNOSTIC IMAGING REPORT
Indication: Chest pain



Comparison:  4/18/16



A single view chest radiograph was obtained.



Findings:



The patient's hand is obscuring the right lung base. There is suggestion of 

mild

atelectasis at the left lung base with low lung volumes noted. Bones are 

osteopenic.

Heart size is normal. Pulmonary vascularity is within normal limits.



Impression:



Suboptimal examination as described above. Mild left basal atelectasis noted.

## 2017-11-09 NOTE — HISTORY AND PHYSICAL REPORT
DATE OF ADMISSION:  11/09/2017



HISTORY OF PRESENT ILLNESS:  The patient is an 83-year-old male, in

vegetative state in [facility], DNR and DNI,

who had coffee-ground vomitus, was sent to emergency room here for

evaluation.  After he was evaluated by Dr. Connolly in emergency room the

patient is being admitted with gastrointestinal bleed, hypernatremia,

dehydration, and evidence of urinary tract infection.



PAST MEDICAL HISTORY:  Significant for dementia, hypothyroidism, previous

UTI, PEG, malnutrition, anemia, and hypertension.



MEDICATIONS:  According to the list.



PHYSICAL EXAMINATION:

VITAL SIGNS:  At this time, the patient's blood pressure _____, pulse rate

106, T-max 100, respiratory rate 21,

GENERAL:  Nonverbal.  Face slightly pale.

HEENT:  Head is normocephalic.

NECK:  Rigid to all direction.

LUNGS:  Poor inspiratory effort.  Decreased breath sound over the bases.

HEART:  Regular.  Occasional irregular beat.  Slightly tachy.  GT-tube in

place.

ABDOMEN:  Soft.

EXTREMITIES:  Lower extremities, somewhat contracted.



LABORATORY DATA:  Hemoglobin 12.9.  BUN 44, sodium 151.  Albumin 2.6.

Urine has 3+ leukocyte esterase, 2+ protein, 30 RBCs, and 10 white blood

cells.



IMPRESSION:  Coffee-ground vomiting and gastrointestinal bleed most likely

secondary to underlying infection and urinary tract infection, also

evidence of dehydration and malnutrition.  The patient has PEG and

hypernatremia due to dehydration.  The patient has organic brain syndrome

and hypothyroidism.



PLAN:  Hydrate.  Protonix IV.  Urine for culture.  Breathing treatment.

Rocephin.  Pulmonary evaluation.  According to how the patient's condition

evolves, we make the proper changes in our future management.  The patient

is DNR/DNI.









  ______________________________________________

  Morales Hill M.D. DR:  [place]

D:  11/09/2017 17:00

T:  11/09/2017 22:34

JOB#:  [id number]

CC:

## 2017-11-10 VITALS — DIASTOLIC BLOOD PRESSURE: 67 MMHG | SYSTOLIC BLOOD PRESSURE: 123 MMHG

## 2017-11-10 VITALS — SYSTOLIC BLOOD PRESSURE: 114 MMHG | DIASTOLIC BLOOD PRESSURE: 69 MMHG

## 2017-11-10 VITALS — SYSTOLIC BLOOD PRESSURE: 136 MMHG | DIASTOLIC BLOOD PRESSURE: 89 MMHG

## 2017-11-10 VITALS — DIASTOLIC BLOOD PRESSURE: 61 MMHG | SYSTOLIC BLOOD PRESSURE: 108 MMHG

## 2017-11-10 VITALS — DIASTOLIC BLOOD PRESSURE: 70 MMHG | SYSTOLIC BLOOD PRESSURE: 121 MMHG

## 2017-11-10 VITALS — SYSTOLIC BLOOD PRESSURE: 112 MMHG | DIASTOLIC BLOOD PRESSURE: 63 MMHG

## 2017-11-10 LAB
ALBUMIN/GLOB SERPL: 0.6 {RATIO} (ref 1–2.7)
ALT SERPL-CCNC: 72 U/L (ref 12–78)
ANION GAP SERPL CALC-SCNC: 7 MMOL/L (ref 5–15)
AST SERPL-CCNC: 78 U/L (ref 15–37)
BASOPHILS NFR BLD MANUAL: 0 % (ref 0–2)
BILIRUB DIRECT SERPL-MCNC: 0.9 MG/DL (ref 0–0.3)
CALCIUM SERPL-MCNC: 8.6 MG/DL (ref 8.5–10.1)
CHLORIDE SERPL-SCNC: 101 MMOL/L (ref 98–107)
CHOLEST SERPL-MCNC: 72 MG/DL (ref ?–200)
CHOLEST/HDLC SERPL: 1.6 {RATIO} (ref 3.3–4.4)
CO2 SERPL-SCNC: 26 MMOL/L (ref 21–32)
CREAT SERPL-MCNC: 0.7 MG/DL (ref 0.55–1.3)
CRP SERPL-MCNC: 11.8 MG/DL (ref 0–0.9)
EOSINOPHIL NFR BLD MANUAL: 0 % (ref 0–3)
ERYTHROCYTE [DISTWIDTH] IN BLOOD BY AUTOMATED COUNT: 11.1 % (ref 11.6–14.8)
FOLATE SERPL-MCNC: 18.3 NG/ML (ref 3.1–17.5)
GLOBULIN SER-MCNC: 4.2 G/DL
MAGNESIUM SERPL-MCNC: 1.8 MG/DL (ref 1.8–2.4)
MCH RBC QN AUTO: 32.6 PG (ref 27–31)
MCHC RBC AUTO-ENTMCNC: 32 G/DL (ref 32–36)
MCV RBC AUTO: 102 FL (ref 80–99)
NEUTS BAND NFR BLD MANUAL: 10 % (ref 0–8)
NEUTS BAND NFR BLD MANUAL: 81 % (ref 45–75)
PHOSPHATE SERPL-MCNC: 2 MG/DL (ref 2.5–4.9)
PLAT MORPH BLD: NORMAL
PLATELET # BLD EST: ADEQUATE 10*3/UL
PLATELET # BLD: 201 K/UL (ref 150–450)
PMV BLD AUTO: 7 FL (ref 6.5–10.1)
POTASSIUM SERPL-SCNC: 4 MMOL/L (ref 3.5–5.1)
PROT SERPL-MCNC: 6.8 G/DL (ref 6.4–8.2)
RBC # BLD AUTO: 3.59 M/UL (ref 4.7–6.1)
SODIUM SERPL-SCNC: 134 MMOL/L (ref 136–145)
TOTAL CELLS COUNTED BLD: 100
TSH SERPL-ACNC: 3.19 UIU/ML (ref 0.36–3.74)
URATE SERPL-MCNC: 3.3 MG/DL (ref 2.6–7.2)
VARIANT LYMPHS NFR BLD MANUAL: 8 % (ref 20–45)
VIT B12 SERPL-MCNC: 655 PG/ML (ref 193–986)
WBC # BLD AUTO: 18.3 K/UL (ref 4.8–10.8)

## 2017-11-10 RX ADMIN — ALBUTEROL SULFATE SCH MG: 2.5 SOLUTION RESPIRATORY (INHALATION) at 20:04

## 2017-11-10 RX ADMIN — PANTOPRAZOLE SODIUM SCH MG: 40 INJECTION, POWDER, FOR SOLUTION INTRAVENOUS at 08:41

## 2017-11-10 RX ADMIN — METOPROLOL TARTRATE SCH MG: 25 TABLET, FILM COATED ORAL at 08:58

## 2017-11-10 RX ADMIN — SODIUM CHLORIDE SCH MLS/HR: 0.9 INJECTION INTRAVENOUS at 18:20

## 2017-11-10 RX ADMIN — SODIUM CHLORIDE SCH MLS/HR: 900 INJECTION, SOLUTION INTRAVENOUS at 08:41

## 2017-11-10 RX ADMIN — METOPROLOL TARTRATE SCH MG: 25 TABLET, FILM COATED ORAL at 20:37

## 2017-11-10 RX ADMIN — HEPARIN SODIUM SCH UNITS: 5000 INJECTION INTRAVENOUS; SUBCUTANEOUS at 08:52

## 2017-11-10 RX ADMIN — ALBUTEROL SULFATE SCH MG: 2.5 SOLUTION RESPIRATORY (INHALATION) at 08:16

## 2017-11-10 RX ADMIN — ALBUTEROL SULFATE SCH MG: 2.5 SOLUTION RESPIRATORY (INHALATION) at 12:54

## 2017-11-10 RX ADMIN — PANTOPRAZOLE SODIUM SCH MG: 40 INJECTION, POWDER, FOR SOLUTION INTRAVENOUS at 20:37

## 2017-11-10 NOTE — PULMONOLOGY PROGRESS NOTE
Assessment/Plan


Problems:  


(1) Gastrointestinal hemorrhage


(2) S/P percutaneous endoscopic gastrostomy (PEG) tube placement


(3) Hypernatremia


(4) Hematuria


(5) HTN (hypertension)


(6) CAD (coronary artery disease)


(7) Dementia


Assessment/Plan


GI evaluation


npo


check electrolytes


abx


check cultures





Subjective


ROS Limited/Unobtainable:  Yes


Allergies:  


Coded Allergies:  


     NO KNOWN DRUG ALLERGIES (Unverified  Allergy, Unknown, 1/28/16)





Objective





Last 24 Hour Vital Signs








  Date Time  Temp Pulse Resp B/P (MAP) Pulse Ox O2 Delivery O2 Flow Rate FiO2


 


11/10/17 08:58  102  108/61    


 


11/10/17 08:26  94 20  97 Nasal Cannula 4.0 36


 


11/10/17 08:18  92 20  95 Nasal Cannula 4.0 36


 


11/10/17 08:18     95 Nasal Cannula 4.0 36


 


11/10/17 08:18      Nasal Cannula 4.0 36


 


11/10/17 08:00 97.5 92 20 108/61 90 Room Air  


 


11/10/17 04:00 97.7 97 18 114/69 100 Nasal Cannula 4.0 


 


11/10/17 04:00  98      


 


11/10/17 00:00 98.4  20 121/70 100 Nasal Cannula 4.0 


 


11/10/17 00:00  94      


 


11/9/17 21:20  99  135/55    


 


11/9/17 20:00 97.9 93 20 135/74 100 Nasal Cannula 4.0 


 


11/9/17 20:00  86      


 


11/9/17 19:42  92 18  97 Nasal Cannula 4.0 36


 


11/9/17 19:33  90 18   Nasal Cannula 4.0 36


 


11/9/17 19:33     94 Nasal Cannula 4.0 36


 


11/9/17 19:33  90 18  94 Nasal Cannula 4.0 36


 


11/9/17 19:33      Nasal Cannula 4.0 36


 


11/9/17 18:15 98.1 93 19 103/63  Nasal Cannula 4.0 


 


11/9/17 16:00  89      


 


11/9/17 13:58 100.0 66 21 118/56 94 Simple Mask 4.0 


 


11/9/17 13:20 100.0 66 21 118/62 94 Simple Mask 4.0 








General Appearance:  cachetic


HEENT:  normocephalic, atraumatic


Respiratory/Chest:  chest wall non-tender, lungs clear, chest wall tender


Cardiovascular:  normal rate


Abdomen:  normal bowel sounds, soft, non tender


Genitourinary:  normal external genitalia


Extremities:  no clubbing


Skin:  no rash


Neurologic/Psychiatric:  CNs II-XII grossly normal





Microbiology








 Date/Time


Source Procedure


Growth Status


 


 


 11/9/17 14:01


Urine,Clean Catch Urine Culture - Preliminary


Gram Negative Bacillus 1 Resulted








Laboratory Tests


11/9/17 14:01: 


Urine Color Yellow, Urine Appearance Slightly cloudy, Urine pH 7, Urine 

Specific Gravity 1.010, Urine Protein 2+H, Urine Glucose (UA) Negative, Urine 

Ketones 1+H, Urine Occult Blood 5+H, Urine Nitrite PositiveH, Urine Bilirubin 

Negative, Urine Urobilinogen 4H, Urine Leukocyte Esterase 3+H, Urine RBC 20-30H

, Urine WBC 5-10H, Urine Squamous Epithelial Cells Occasional, Urine Bacteria 

ManyH


11/10/17 09:40: 


White Blood Count 18.3#H, Red Blood Count 3.59L, Hemoglobin 11.7L, Hematocrit 

36.5L, Mean Corpuscular Volume 102H, Mean Corpuscular Hemoglobin 32.6H, Mean 

Corpuscular Hemoglobin Concent 32.0, Red Cell Distribution Width 11.1L, 

Platelet Count 201, Mean Platelet Volume 7.0, Neutrophils (%) (Auto) , 

Lymphocytes (%) (Auto) , Monocytes (%) (Auto) , Eosinophils (%) (Auto) , 

Basophils (%) (Auto) , Differential Total Cells Counted 100, Neutrophils % (

Manual) 81H, Lymphocytes % (Manual) 8L, Monocytes % (Manual) 1, Eosinophils % (

Manual) 0, Basophils % (Manual) 0, Band Neutrophils 10H, Platelet Estimate 

Adequate, Platelet Morphology Normal, Sodium Level 134#L, Potassium Level 4.0, 

Chloride Level 101, Carbon Dioxide Level 26, Anion Gap 7, Blood Urea Nitrogen 

27H, Creatinine 0.7, Estimat Glomerular Filtration Rate , Glucose Level 133H, 

Uric Acid 3.3, Calcium Level 8.6, Phosphorus Level 2.0L, Magnesium Level 1.8, 

Total Bilirubin 1.2H, Direct Bilirubin 0.9H, Gamma Glutamyl Transpeptidase 83, 

Aspartate Amino Transf (AST/SGOT) 78H, Alanine Aminotransferase (ALT/SGPT) 72, 

Alkaline Phosphatase 68, C-Reactive Protein, Quantitative 11.8H, Pro-B-Type 

Natriuretic Peptide 2670H, Total Protein 6.8, Albumin 2.6L, Globulin 4.2, 

Albumin/Globulin Ratio 0.6L, Triglycerides Level 22, Cholesterol Level 72, LDL 

Cholesterol 29, HDL Cholesterol 45, Cholesterol/HDL Ratio 1.6L, Vitamin B12 

Level 655, Folate 18.3H, Thyroid Stimulating Hormone (TSH) 3.189





Current Medications








 Medications


  (Trade)  Dose


 Ordered  Sig/Benita


 Route


 PRN Reason  Start Time


 Stop Time Status Last Admin


Dose Admin


 


 Albuterol Sulfate


  (Proventil)  2.5 mg  TIDRT


 HHN


   11/9/17 19:00


 11/14/17 18:59  11/10/17 08:16


 


 


 Ceftriaxone


 Sodium 1 gm/


 Dextrose  55 ml @ 


 110 mls/hr  Q24H


 IVPB


   11/9/17 18:00


 11/16/17 17:59  11/9/17 18:22


 


 


 Dextrose/Sodium


 Chloride  1,000 ml @ 


 50 mls/hr  Q20H


 IV


   11/10/17 13:00


 12/10/17 12:59   


 


 


 Haloperidol


 Lactate


  (Haldol)  2 mg  Q4H  PRN


 IM


 Agitation  11/9/17 17:00


 12/9/17 16:59   


 


 


 Heparin Sodium


  (Porcine)


  (Heparin 5000


 units/ml)  5,000 units  EVERY 12  HOURS


 SUBQ


   11/9/17 21:00


 12/9/17 20:59  11/10/17 08:52


 


 


 Metoprolol


 Tartrate


  (Lopressor)  25 mg  Q12HR


 GT


   11/10/17 21:00


 12/10/17 20:59   


 


 


 Pantoprazole


  (Protonix)  40 mg  EVERY 12  HOURS


 IVP


   11/9/17 21:00


 12/9/17 20:59  11/10/17 08:41


 


 


 Sodium Phosphate


 15 mm/Sodium


 Chloride  280 ml @ 


 70.273 mls/


 hr  ONCE  ONCE


 IVPB


   11/10/17 14:00


 11/10/17 17:59   


 

















DONALD HAND Nov 10, 2017 12:48

## 2017-11-10 NOTE — GENERAL PROGRESS NOTE
Assessment/Plan


Status:  stable


Status Narrative





H&H stable-


Urine c/s pending


Assessment/Plan





status:


coffee ground vomitus


PEG


UTI


Dehydration , High Na


MalNutrition








Plan:





Hydrate-


Protonix IV


Urine c/s


HHN


Rocephin


start GT feeding


Med-Surge





Subjective


ROS Limited/Unobtainable:  Yes


Allergies:  


Coded Allergies:  


     NO KNOWN DRUG ALLERGIES (Unverified  Allergy, Unknown, 1/28/16)





Objective





Last 24 Hour Vital Signs








  Date Time  Temp Pulse Resp B/P (MAP) Pulse Ox O2 Delivery O2 Flow Rate FiO2


 


11/10/17 08:58  102  108/61    


 


11/10/17 08:26  94 20  97 Nasal Cannula 4.0 36


 


11/10/17 08:18  92 20  95 Nasal Cannula 4.0 36


 


11/10/17 08:18     95 Nasal Cannula 4.0 36


 


11/10/17 08:18      Nasal Cannula 4.0 36


 


11/10/17 08:00 97.5 92 20 108/61 90 Room Air  


 


11/10/17 04:00 97.7 97 18 114/69 100 Nasal Cannula 4.0 


 


11/10/17 04:00  98      


 


11/10/17 00:00 98.4  20 121/70 100 Nasal Cannula 4.0 


 


11/10/17 00:00  94      


 


11/9/17 21:20  99  135/55    


 


11/9/17 20:00 97.9 93 20 135/74 100 Nasal Cannula 4.0 


 


11/9/17 20:00  86      


 


11/9/17 19:42  92 18  97 Nasal Cannula 4.0 36


 


11/9/17 19:33  90 18   Nasal Cannula 4.0 36


 


11/9/17 19:33     94 Nasal Cannula 4.0 36


 


11/9/17 19:33  90 18  94 Nasal Cannula 4.0 36


 


11/9/17 19:33      Nasal Cannula 4.0 36


 


11/9/17 18:15 98.1 93 19 103/63  Nasal Cannula 4.0 


 


11/9/17 16:00  89      


 


11/9/17 13:58 100.0 66 21 118/56 94 Simple Mask 4.0 


 


11/9/17 13:20 100.0 66 21 118/62 94 Simple Mask 4.0 








Laboratory Tests


11/9/17 14:01: 


Urine Color Yellow, Urine Appearance Slightly cloudy, Urine pH 7, Urine 

Specific Gravity 1.010, Urine Protein 2+H, Urine Glucose (UA) Negative, Urine 

Ketones 1+H, Urine Occult Blood 5+H, Urine Nitrite PositiveH, Urine Bilirubin 

Negative, Urine Urobilinogen 4H, Urine Leukocyte Esterase 3+H, Urine RBC 20-30H

, Urine WBC 5-10H, Urine Squamous Epithelial Cells Occasional, Urine Bacteria 

ManyH


11/10/17 09:40: 


White Blood Count 18.3#H, Red Blood Count 3.59L, Hemoglobin 11.7L, Hematocrit 

36.5L, Mean Corpuscular Volume 102H, Mean Corpuscular Hemoglobin 32.6H, Mean 

Corpuscular Hemoglobin Concent 32.0, Red Cell Distribution Width 11.1L, 

Platelet Count 201, Mean Platelet Volume 7.0, Neutrophils (%) (Auto) , 

Lymphocytes (%) (Auto) , Monocytes (%) (Auto) , Eosinophils (%) (Auto) , 

Basophils (%) (Auto) , Differential Total Cells Counted 100, Neutrophils % (

Manual) 81H, Lymphocytes % (Manual) 8L, Monocytes % (Manual) 1, Eosinophils % (

Manual) 0, Basophils % (Manual) 0, Band Neutrophils 10H, Platelet Estimate 

Adequate, Platelet Morphology Normal, Sodium Level 134#L, Potassium Level 4.0, 

Chloride Level 101, Carbon Dioxide Level 26, Anion Gap 7, Blood Urea Nitrogen 

27H, Creatinine 0.7, Estimat Glomerular Filtration Rate , Glucose Level 133H, 

Uric Acid 3.3, Calcium Level 8.6, Phosphorus Level 2.0L, Magnesium Level 1.8, 

Total Bilirubin 1.2H, Direct Bilirubin 0.9H, Gamma Glutamyl Transpeptidase 83, 

Aspartate Amino Transf (AST/SGOT) 78H, Alanine Aminotransferase (ALT/SGPT) 72, 

Alkaline Phosphatase 68, C-Reactive Protein, Quantitative 11.8H, Pro-B-Type 

Natriuretic Peptide 2670H, Total Protein 6.8, Albumin 2.6L, Globulin 4.2, 

Albumin/Globulin Ratio 0.6L, Triglycerides Level 22, Cholesterol Level 72, LDL 

Cholesterol 29, HDL Cholesterol 45, Cholesterol/HDL Ratio 1.6L, Vitamin B12 

Level 655, Folate 18.3H, Thyroid Stimulating Hormone (TSH) 3.189


Height (Feet):  5


Height (Inches):  4.00


Weight (Pounds):  110











LEORA SARMIENTO Nov 10, 2017 12:29

## 2017-11-11 VITALS — SYSTOLIC BLOOD PRESSURE: 136 MMHG | DIASTOLIC BLOOD PRESSURE: 85 MMHG

## 2017-11-11 VITALS — SYSTOLIC BLOOD PRESSURE: 151 MMHG | DIASTOLIC BLOOD PRESSURE: 81 MMHG

## 2017-11-11 VITALS — SYSTOLIC BLOOD PRESSURE: 123 MMHG | DIASTOLIC BLOOD PRESSURE: 56 MMHG

## 2017-11-11 VITALS — SYSTOLIC BLOOD PRESSURE: 137 MMHG | DIASTOLIC BLOOD PRESSURE: 60 MMHG

## 2017-11-11 VITALS — DIASTOLIC BLOOD PRESSURE: 56 MMHG | SYSTOLIC BLOOD PRESSURE: 123 MMHG

## 2017-11-11 VITALS — DIASTOLIC BLOOD PRESSURE: 51 MMHG | SYSTOLIC BLOOD PRESSURE: 120 MMHG

## 2017-11-11 VITALS — SYSTOLIC BLOOD PRESSURE: 142 MMHG | DIASTOLIC BLOOD PRESSURE: 65 MMHG

## 2017-11-11 LAB
APPEARANCE UR: (no result)
BACTERIA #/AREA URNS HPF: (no result) /HPF
ICTOTEST: NEGATIVE
KETONES UR QL STRIP: NEGATIVE
LEUKOCYTE ESTERASE UR QL STRIP: (no result)
MUCOUS THREADS #/AREA URNS LPF: (no result) /LPF
NITRITE UR QL STRIP: NEGATIVE
PH UR STRIP: 6 [PH] (ref 4.5–8)
PROT UR QL STRIP: (no result)
RBC #/AREA URNS HPF: (no result) /HPF (ref 0–0)
SP GR UR STRIP: 1.01 (ref 1–1.03)
SQUAMOUS #/AREA URNS LPF: (no result) /LPF
UROBILINOGEN UR-MCNC: 8 MG/DL (ref 0–1)

## 2017-11-11 RX ADMIN — METOPROLOL TARTRATE SCH MG: 25 TABLET, FILM COATED ORAL at 09:20

## 2017-11-11 RX ADMIN — PANTOPRAZOLE SODIUM SCH MG: 40 INJECTION, POWDER, FOR SOLUTION INTRAVENOUS at 09:21

## 2017-11-11 RX ADMIN — METOPROLOL TARTRATE SCH MG: 25 TABLET, FILM COATED ORAL at 21:10

## 2017-11-11 RX ADMIN — ALBUTEROL SULFATE SCH MG: 2.5 SOLUTION RESPIRATORY (INHALATION) at 07:23

## 2017-11-11 RX ADMIN — ALBUTEROL SULFATE SCH MG: 2.5 SOLUTION RESPIRATORY (INHALATION) at 19:28

## 2017-11-11 RX ADMIN — ALBUTEROL SULFATE SCH MG: 2.5 SOLUTION RESPIRATORY (INHALATION) at 14:04

## 2017-11-11 RX ADMIN — SODIUM CHLORIDE SCH MLS/HR: 0.9 INJECTION INTRAVENOUS at 18:03

## 2017-11-11 RX ADMIN — PANTOPRAZOLE SODIUM SCH MG: 40 INJECTION, POWDER, FOR SOLUTION INTRAVENOUS at 21:11

## 2017-11-11 NOTE — GENERAL PROGRESS NOTE
Assessment/Plan


Status:  stable


Assessment/Plan





status:


coffee ground vomitus


PEG


UTI, with mild henmaturia


Dehydration , High Na


MalNutrition








Plan:





labs in am


stop IV fluid


Protonix IV


Urine c/s- pending final


HHN


Rocephin


Up GT feeding





Subjective


ROS Limited/Unobtainable:  No


Allergies:  


Coded Allergies:  


     NO KNOWN DRUG ALLERGIES (Unverified  Allergy, Unknown, 1/28/16)





Objective





Last 24 Hour Vital Signs








  Date Time  Temp Pulse Resp B/P (MAP) Pulse Ox O2 Delivery O2 Flow Rate FiO2


 


11/11/17 07:32  91 18  99 Nasal Cannula 3.0 32


 


11/11/17 07:24     96 Nasal Cannula 3.0 32


 


11/11/17 07:24  81 16  96 Nasal Cannula 3.0 32


 


11/11/17 07:24      Nasal Cannula 3.0 32


 


11/11/17 04:00 97.9 87 20 151/81 100 Nasal Cannula 4.0 


 


11/11/17 00:00 97.7 81 18 136/85 99 Nasal Cannula 4.0 


 


11/10/17 20:37  89  136/67    


 


11/10/17 20:13  89 18  99 Nasal Cannula 3.0 32


 


11/10/17 20:04     98 Nasal Cannula 3.0 32


 


11/10/17 20:04  88 18  98 Nasal Cannula 3.0 32


 


11/10/17 20:04      Nasal Cannula 4.0 36


 


11/10/17 20:00 97.7 87 20 136/89 99 Nasal Cannula 4.0 





        


 


11/10/17 15:51 97.5 89 21 123/67 100 Nasal Cannula 2.0 


 


11/10/17 13:04  92 22  97 Nasal Cannula 4.0 36


 


11/10/17 12:54  91 20  95 Nasal Cannula 4.0 36


 


11/10/17 12:00 97.7 93 20 112/63 95 Room Air  


 


11/10/17 08:58  102  108/61    


 


11/10/17 08:26  94 20  97 Nasal Cannula 4.0 36


 


11/10/17 08:18  92 20  95 Nasal Cannula 4.0 36


 


11/10/17 08:18     95 Nasal Cannula 4.0 36


 


11/10/17 08:18      Nasal Cannula 4.0 36








Laboratory Tests


11/10/17 09:40: 


White Blood Count 18.3#H, Red Blood Count 3.59L, Hemoglobin 11.7L, Hematocrit 

36.5L, Mean Corpuscular Volume 102H, Mean Corpuscular Hemoglobin 32.6H, Mean 

Corpuscular Hemoglobin Concent 32.0, Red Cell Distribution Width 11.1L, 

Platelet Count 201, Mean Platelet Volume 7.0, Neutrophils (%) (Auto) , 

Lymphocytes (%) (Auto) , Monocytes (%) (Auto) , Eosinophils (%) (Auto) , 

Basophils (%) (Auto) , Differential Total Cells Counted 100, Neutrophils % (

Manual) 81H, Lymphocytes % (Manual) 8L, Monocytes % (Manual) 1, Eosinophils % (

Manual) 0, Basophils % (Manual) 0, Band Neutrophils 10H, Platelet Estimate 

Adequate, Platelet Morphology Normal, Sodium Level 134#L, Potassium Level 4.0, 

Chloride Level 101, Carbon Dioxide Level 26, Anion Gap 7, Blood Urea Nitrogen 

27H, Creatinine 0.7, Estimat Glomerular Filtration Rate , Glucose Level 133H, 

Uric Acid 3.3, Calcium Level 8.6, Phosphorus Level 2.0L, Magnesium Level 1.8, 

Total Bilirubin 1.2H, Direct Bilirubin 0.9H, Gamma Glutamyl Transpeptidase 83, 

Aspartate Amino Transf (AST/SGOT) 78H, Alanine Aminotransferase (ALT/SGPT) 72, 

Alkaline Phosphatase 68, C-Reactive Protein, Quantitative 11.8H, Pro-B-Type 

Natriuretic Peptide 2670H, Total Protein 6.8, Albumin 2.6L, Globulin 4.2, 

Albumin/Globulin Ratio 0.6L, Triglycerides Level 22, Cholesterol Level 72, LDL 

Cholesterol 29, HDL Cholesterol 45, Cholesterol/HDL Ratio 1.6L, Vitamin B12 

Level 655, Folate 18.3H, Thyroid Stimulating Hormone (TSH) 3.189


Height (Feet):  5


Height (Inches):  4.00


Weight (Pounds):  110


General Appearance:  no apparent distress, other - tolerating feeding


Respiratory/Chest:  decreased breath sounds


Objective


no change in PE











LEORA SARMIENTO Nov 11, 2017 08:13

## 2017-11-11 NOTE — PULMONOLOGY PROGRESS NOTE
Assessment/Plan


Problems:  


(1) Gastrointestinal hemorrhage


(2) S/P percutaneous endoscopic gastrostomy (PEG) tube placement


(3) Hypernatremia


(4) Hematuria


(5) HTN (hypertension)


(6) CAD (coronary artery disease)


(7) Dementia


Assessment/Plan


tolerating feeding


ON Ceftriaxone


check electrolytes


abx


check cultures





Subjective


ROS Limited/Unobtainable:  Yes


Interval Events:  not much chagne, looks cachectic , severly ill


Allergies:  


Coded Allergies:  


     NO KNOWN DRUG ALLERGIES (Unverified  Allergy, Unknown, 1/28/16)





Objective





Last 24 Hour Vital Signs








  Date Time  Temp Pulse Resp B/P (MAP) Pulse Ox O2 Delivery O2 Flow Rate FiO2


 


11/11/17 07:32  91 18  99 Nasal Cannula 3.0 32


 


11/11/17 07:24     96 Nasal Cannula 3.0 32


 


11/11/17 07:24  81 16  96 Nasal Cannula 3.0 32


 


11/11/17 07:24      Nasal Cannula 3.0 32


 


11/11/17 04:00 97.9 87 20 151/81 100 Nasal Cannula 4.0 


 


11/11/17 00:00 97.7 81 18 136/85 99 Nasal Cannula 4.0 


 


11/10/17 20:37  89  136/67    


 


11/10/17 20:13  89 18  99 Nasal Cannula 3.0 32


 


11/10/17 20:04     98 Nasal Cannula 3.0 32


 


11/10/17 20:04  88 18  98 Nasal Cannula 3.0 32


 


11/10/17 20:04      Nasal Cannula 4.0 36


 


11/10/17 20:00 97.7 87 20 136/89 99 Nasal Cannula 4.0 





        


 


11/10/17 15:51 97.5 89 21 123/67 100 Nasal Cannula 2.0 


 


11/10/17 13:04  92 22  97 Nasal Cannula 4.0 36


 


11/10/17 12:54  91 20  95 Nasal Cannula 4.0 36


 


11/10/17 12:00 97.7 93 20 112/63 95 Room Air  








General Appearance:  cachetic


HEENT:  normocephalic, atraumatic


Respiratory/Chest:  chest wall non-tender, lungs clear


Cardiovascular:  normal peripheral pulses, normal rate


Abdomen:  normal bowel sounds, no organomegaly


Genitourinary:  normal external genitalia


Skin:  no rash





Microbiology








 Date/Time


Source Procedure


Growth Status


 


 


 11/9/17 14:01


Urine,Clean Catch Urine Culture - Preliminary


Gram Negative Bacillus 1 Resulted








Laboratory Tests


11/10/17 09:40: 


White Blood Count 18.3#H, Red Blood Count 3.59L, Hemoglobin 11.7L, Hematocrit 

36.5L, Mean Corpuscular Volume 102H, Mean Corpuscular Hemoglobin 32.6H, Mean 

Corpuscular Hemoglobin Concent 32.0, Red Cell Distribution Width 11.1L, 

Platelet Count 201, Mean Platelet Volume 7.0, Neutrophils (%) (Auto) , 

Lymphocytes (%) (Auto) , Monocytes (%) (Auto) , Eosinophils (%) (Auto) , 

Basophils (%) (Auto) , Differential Total Cells Counted 100, Neutrophils % (

Manual) 81H, Lymphocytes % (Manual) 8L, Monocytes % (Manual) 1, Eosinophils % (

Manual) 0, Basophils % (Manual) 0, Band Neutrophils 10H, Platelet Estimate 

Adequate, Platelet Morphology Normal, Sodium Level 134#L, Potassium Level 4.0, 

Chloride Level 101, Carbon Dioxide Level 26, Anion Gap 7, Blood Urea Nitrogen 

27H, Creatinine 0.7, Estimat Glomerular Filtration Rate , Glucose Level 133H, 

Uric Acid 3.3, Calcium Level 8.6, Phosphorus Level 2.0L, Magnesium Level 1.8, 

Total Bilirubin 1.2H, Direct Bilirubin 0.9H, Gamma Glutamyl Transpeptidase 83, 

Aspartate Amino Transf (AST/SGOT) 78H, Alanine Aminotransferase (ALT/SGPT) 72, 

Alkaline Phosphatase 68, C-Reactive Protein, Quantitative 11.8H, Pro-B-Type 

Natriuretic Peptide 2670H, Total Protein 6.8, Albumin 2.6L, Globulin 4.2, 

Albumin/Globulin Ratio 0.6L, Triglycerides Level 22, Cholesterol Level 72, LDL 

Cholesterol 29, HDL Cholesterol 45, Cholesterol/HDL Ratio 1.6L, Vitamin B12 

Level 655, Folate 18.3H, Thyroid Stimulating Hormone (TSH) 3.189





Current Medications








 Medications


  (Trade)  Dose


 Ordered  Sig/Benita


 Route


 PRN Reason  Start Time


 Stop Time Status Last Admin


Dose Admin


 


 Albuterol Sulfate


  (Proventil)  2.5 mg  TIDRT


 HHN


   11/10/17 19:00


 11/14/17 18:59  11/11/17 07:23


 


 


 Ceftriaxone


 Sodium 1 gm/


 Dextrose  55 ml @ 


 110 mls/hr  Q24H


 IVPB


   11/10/17 18:00


 11/16/17 17:59  11/10/17 18:20


 


 


 Haloperidol


 Lactate


  (Haldol)  2 mg  Q4H  PRN


 IM


 Agitation  11/10/17 17:00


 12/9/17 16:59   


 


 


 Metoprolol


 Tartrate


  (Lopressor)  25 mg  Q12HR


 GT


   11/10/17 21:00


 12/10/17 20:59  11/10/17 20:37


 


 


 Pantoprazole


  (Protonix)  40 mg  EVERY 12  HOURS


 IVP


   11/10/17 21:00


 12/9/17 20:59  11/10/17 20:37


 

















DONALD HAND Nov 11, 2017 09:07

## 2017-11-11 NOTE — CONSULTATION
DATE OF CONSULTATION:  11/10/2017



CONSULTING PHYSICIAN:  Flako Akers M.D.



REASON FOR CONSULTATION:  Gross hematuria.  The patient was admitted to the

hospital with gastrointestinal bleeding.



HISTORY OF PRESENT ILLNESS:  The patient is an 83-year-old man, nursing

home patient, presents with coffee-ground emesis.  Fuchs catheter was

placed.  He developed some mild gross hematuria.



PAST MEDICAL HISTORY:  Significant for status post percutaneous endoscopic

gastrostomy, dementia, coronary artery disease, and hypertension.



ALLERGIES:  No known allergies.



MEDICATIONS:  He is on acetaminophen and albuterol.



REVIEW OF SYMPTOMS:  The patient is unresponsive and cannot obtain review

of symptoms.



PHYSICAL EXAMINATION:

HEENT:  He is cachectic and normocephalic.

RESPIRATORY:  Chest wall, nontender.

BREASTS:  No masses.

CARDIOVASCULAR:  Normal peripheral pulses.

ABDOMEN:  Soft and nontender.  Fuchs catheter is in place.

SCROTAL EXAM:  Normal.

RECTAL EXAM:  Normal.



LABORATORY AND DIAGNOSTIC DATA:  Urine has a slight tinge of blood in the

tubing yellow.  His laboratory work, his white count is 7.7 and hematocrit

40.7.  His urine culture shows gram-negative bacilli.



ASSESSMENT AND PLAN:  The patient has mild gross hematuria.  I would

recommend to keep the Fuchs catheter.  Give him oral intravenous

antibiotics.  Stop heparin at this point to wait until the hematuria

resolves.  I will follow this patient with you.



Thanks for the consultation.









  ______________________________________________

  Flako Akers M.D.





DR:  MYNOR/MERON

D:  11/10/2017 19:24

T:  11/11/2017 01:39

JOB#:  [id number]

CC:

## 2017-11-12 VITALS — SYSTOLIC BLOOD PRESSURE: 112 MMHG | DIASTOLIC BLOOD PRESSURE: 75 MMHG

## 2017-11-12 VITALS — DIASTOLIC BLOOD PRESSURE: 52 MMHG | SYSTOLIC BLOOD PRESSURE: 131 MMHG

## 2017-11-12 VITALS — DIASTOLIC BLOOD PRESSURE: 56 MMHG | SYSTOLIC BLOOD PRESSURE: 135 MMHG

## 2017-11-12 VITALS — SYSTOLIC BLOOD PRESSURE: 161 MMHG | DIASTOLIC BLOOD PRESSURE: 69 MMHG

## 2017-11-12 VITALS — SYSTOLIC BLOOD PRESSURE: 140 MMHG | DIASTOLIC BLOOD PRESSURE: 74 MMHG

## 2017-11-12 VITALS — SYSTOLIC BLOOD PRESSURE: 127 MMHG | DIASTOLIC BLOOD PRESSURE: 65 MMHG

## 2017-11-12 LAB
ALBUMIN/GLOB SERPL: 0.6 {RATIO} (ref 1–2.7)
ALT SERPL-CCNC: 71 U/L (ref 12–78)
ANION GAP SERPL CALC-SCNC: 6 MMOL/L (ref 5–15)
AST SERPL-CCNC: 68 U/L (ref 15–37)
BASOPHILS NFR BLD AUTO: 0.3 % (ref 0–2)
CALCIUM SERPL-MCNC: 8.3 MG/DL (ref 8.5–10.1)
CHLORIDE SERPL-SCNC: 109 MMOL/L (ref 98–107)
CO2 SERPL-SCNC: 28 MMOL/L (ref 21–32)
CREAT SERPL-MCNC: 0.6 MG/DL (ref 0.55–1.3)
CRP SERPL-MCNC: 6 MG/DL (ref 0–0.9)
EOSINOPHIL NFR BLD AUTO: 0 % (ref 0–3)
ERYTHROCYTE [DISTWIDTH] IN BLOOD BY AUTOMATED COUNT: 11.3 % (ref 11.6–14.8)
GLOBULIN SER-MCNC: 3.9 G/DL
LYMPHOCYTES NFR BLD AUTO: 10.9 % (ref 20–45)
MAGNESIUM SERPL-MCNC: 1.9 MG/DL (ref 1.8–2.4)
MCH RBC QN AUTO: 34.1 PG (ref 27–31)
MCHC RBC AUTO-ENTMCNC: 34 G/DL (ref 32–36)
MCV RBC AUTO: 101 FL (ref 80–99)
MONOCYTES NFR BLD AUTO: 7.2 % (ref 1–10)
NEUTROPHILS NFR BLD AUTO: 81.6 % (ref 45–75)
PHOSPHATE SERPL-MCNC: 1 MG/DL (ref 2.5–4.9)
PLATELET # BLD: 195 K/UL (ref 150–450)
PMV BLD AUTO: 7.3 FL (ref 6.5–10.1)
POTASSIUM SERPL-SCNC: 3.2 MMOL/L (ref 3.5–5.1)
PROT SERPL-MCNC: 6.1 G/DL (ref 6.4–8.2)
RBC # BLD AUTO: 3.33 M/UL (ref 4.7–6.1)
SODIUM SERPL-SCNC: 143 MMOL/L (ref 136–145)
URATE SERPL-MCNC: 2 MG/DL (ref 2.6–7.2)
WBC # BLD AUTO: 14.9 K/UL (ref 4.8–10.8)

## 2017-11-12 RX ADMIN — SODIUM CHLORIDE SCH MLS/HR: 0.9 INJECTION INTRAVENOUS at 18:16

## 2017-11-12 RX ADMIN — SODIUM CHLORIDE SCH MG: 0.9 INJECTION INTRAVENOUS at 15:02

## 2017-11-12 RX ADMIN — SODIUM CHLORIDE SCH MG: 0.9 INJECTION INTRAVENOUS at 22:25

## 2017-11-12 RX ADMIN — PANTOPRAZOLE SODIUM SCH MG: 40 INJECTION, POWDER, FOR SOLUTION INTRAVENOUS at 09:39

## 2017-11-12 RX ADMIN — ALBUTEROL SULFATE SCH MG: 2.5 SOLUTION RESPIRATORY (INHALATION) at 07:42

## 2017-11-12 RX ADMIN — METOPROLOL TARTRATE SCH MG: 25 TABLET, FILM COATED ORAL at 21:30

## 2017-11-12 RX ADMIN — DIBASIC SODIUM PHOSPHATE, MONOBASIC POTASSIUM PHOSPHATE AND MONOBASIC SODIUM PHOSPHATE SCH MG: 852; 155; 130 TABLET ORAL at 12:57

## 2017-11-12 RX ADMIN — ALBUTEROL SULFATE SCH MG: 2.5 SOLUTION RESPIRATORY (INHALATION) at 19:47

## 2017-11-12 RX ADMIN — PANTOPRAZOLE SODIUM SCH MG: 40 INJECTION, POWDER, FOR SOLUTION INTRAVENOUS at 21:30

## 2017-11-12 RX ADMIN — DIBASIC SODIUM PHOSPHATE, MONOBASIC POTASSIUM PHOSPHATE AND MONOBASIC SODIUM PHOSPHATE SCH MG: 852; 155; 130 TABLET ORAL at 10:27

## 2017-11-12 RX ADMIN — DIBASIC SODIUM PHOSPHATE, MONOBASIC POTASSIUM PHOSPHATE AND MONOBASIC SODIUM PHOSPHATE SCH MG: 852; 155; 130 TABLET ORAL at 18:16

## 2017-11-12 RX ADMIN — ALBUTEROL SULFATE SCH MG: 2.5 SOLUTION RESPIRATORY (INHALATION) at 14:10

## 2017-11-12 RX ADMIN — METOPROLOL TARTRATE SCH MG: 25 TABLET, FILM COATED ORAL at 09:39

## 2017-11-12 NOTE — PULMONOLOGY PROGRESS NOTE
Assessment/Plan


Problems:  


(1) Gastrointestinal hemorrhage


(2) S/P percutaneous endoscopic gastrostomy (PEG) tube placement


(3) Hypernatremia


(4) Hematuria


(5) HTN (hypertension)


(6) CAD (coronary artery disease)


(7) Dementia


Assessment/Plan


all noted and reviewed


tolerating feeding


ON Ceftriaxone


check electrolytes


abx


check cultures





Subjective


ROS Limited/Unobtainable:  Yes


Allergies:  


Coded Allergies:  


     NO KNOWN DRUG ALLERGIES (Unverified  Allergy, Unknown, 1/28/16)





Objective





Last 24 Hour Vital Signs








  Date Time  Temp Pulse Resp B/P (MAP) Pulse Ox O2 Delivery O2 Flow Rate FiO2


 


11/12/17 20:00 98.2 96 19 140/74 93 Nasal Cannula  


 


11/12/17 19:57  93 16  99 Nasal Cannula 3.0 32


 


11/12/17 19:47  92 16  96 Nasal Cannula 3.0 32


 


11/12/17 19:47      Nasal Cannula 3.0 32


 


11/12/17 19:47     96 Nasal Cannula 3.0 32


 


11/12/17 16:33 97.9 103 22 112/75 95 Nasal Cannula 3.0 


 


11/12/17 13:05  95 18  98 Nasal Cannula 3.0 32


 


11/12/17 13:00  102 16  95 Nasal Cannula 3.0 32


 


11/12/17 12:15 98.0 100 19 161/69 97 Nasal Cannula 2.0 


 


11/12/17 09:39  92  127/65    


 


11/12/17 08:01 97.6 92 19 127/65 92 Nasal Cannula 2.0 


 


11/12/17 07:43  89 16  97 Nasal Cannula 3.0 32


 


11/12/17 07:38     95 Nasal Cannula 3.0 32


 


11/12/17 07:38  83 18  95 Nasal Cannula 3.0 32


 


11/12/17 07:38      Nasal Cannula 3.0 32


 


11/12/17 04:00 98.0 80 18 135/56 99 Nasal Cannula 2.0 


 


11/12/17 00:00 98.4 84 20 131/52 100 Nasal Cannula 2.0 

















Intake and Output  


 


 11/12/17 11/13/17





 19:00 07:00


 


Intake Total 900.0 ml 


 


Output Total 500 ml 


 


Balance 400.0 ml 


 


  


 


Free Water 120 ml 


 


IV Total 340.0 ml 


 


Tube Feeding 440 ml 


 


Output Urine Total 500 ml 


 


# Bowel Movements 1 








Objective


General Appearance:  WD/WN, no apparent distress


Lines, tubes and drains:  peripheral, 


HEENT:  normocephalic, anicteric


Neck:  non-tender, normal alignment


Respiratory/Chest:  chest wall non-tender, lungs clear


Cardiovascular/Chest:  normal rate, regular rhythm


Abdomen:  non tender, soft, Gtube in place


Genitourinary/Rectal:  normal genital exam, normal rectal exam


Extremities:  normal range of motion, non-pitting





Microbiology








 Date/Time


Source Procedure


Growth Status


 


 


 11/11/17 12:40


Urine,Clean Catch Urine Culture - Preliminary


NO GROWTH Resulted








Laboratory Tests


11/12/17 06:29: 


White Blood Count 14.9H, Red Blood Count 3.33L, Hemoglobin 11.4L, Hematocrit 

33.5L, Mean Corpuscular Volume 101H, Mean Corpuscular Hemoglobin 34.1H, Mean 

Corpuscular Hemoglobin Concent 34.0, Red Cell Distribution Width 11.3L, 

Platelet Count 195, Mean Platelet Volume 7.3, Neutrophils (%) (Auto) 81.6H, 

Lymphocytes (%) (Auto) 10.9L, Monocytes (%) (Auto) 7.2, Eosinophils (%) (Auto) 

0.0, Basophils (%) (Auto) 0.3, Sodium Level 143, Potassium Level 3.2L, Chloride 

Level 109H, Carbon Dioxide Level 28, Anion Gap 6, Blood Urea Nitrogen 26H, 

Creatinine 0.6, Estimat Glomerular Filtration Rate , Glucose Level 153H, Uric 

Acid 2.0L, Calcium Level 8.3L, Phosphorus Level 1.0L, Magnesium Level 1.9, 

Total Bilirubin 0.7, Aspartate Amino Transf (AST/SGOT) 68H, Alanine 

Aminotransferase (ALT/SGPT) 71, Alkaline Phosphatase 117H, C-Reactive Protein, 

Quantitative 6.0H, Total Protein 6.1L, Albumin 2.2L, Globulin 3.9, Albumin/

Globulin Ratio 0.6L





Current Medications








 Medications


  (Trade)  Dose


 Ordered  Sig/Benita


 Route


 PRN Reason  Start Time


 Stop Time Status Last Admin


Dose Admin


 


 Albuterol Sulfate


  (Proventil)  2.5 mg  TIDRT


 HHN


   11/10/17 19:00


 11/14/17 18:59  11/12/17 19:47


 


 


 Ceftriaxone


 Sodium 1 gm/


 Dextrose  55 ml @ 


 110 mls/hr  Q24H


 IVPB


   11/10/17 18:00


 11/16/17 17:59  11/12/17 18:16


 


 


 Haloperidol


 Lactate


  (Haldol)  2 mg  Q4H  PRN


 IM


 Agitation  11/10/17 17:00


 12/9/17 16:59   


 


 


 Metoclopramide HCl


  (Reglan)  10 mg  Q8HR


 IVP


   11/12/17 14:30


 12/12/17 14:29  11/12/17 15:02


 


 


 Metoprolol


 Tartrate


  (Lopressor)  25 mg  Q12HR


 GT


   11/10/17 21:00


 12/10/17 20:59  11/12/17 09:39


 


 


 Pantoprazole


  (Protonix)  40 mg  EVERY 12  HOURS


 IVP


   11/10/17 21:00


 12/9/17 20:59  11/12/17 09:39


 


 


 Phosphorus


  (Phospha 250


 Neutral)  250 mg  THREE TIMES A  DAY


 GT


   11/12/17 10:00


 12/12/17 09:59  11/12/17 18:16


 

















DONALD HAND Nov 12, 2017 21:17

## 2017-11-12 NOTE — GENERAL PROGRESS NOTE
Assessment/Plan


Status:  stable


Assessment/Plan





status:


coffee ground vomitus


PEG


UTI, with mild henmaturia


Dehydration , High Na


MalNutrition








Plan:





K Phos IV-


DC in am with oral antibiotics


labs in am


stop IV fluid


Protonix IV


Urine c/s- pending final


HHN


Rocephin


Up GT feeding





Subjective


ROS Limited/Unobtainable:  No


Constitutional:  Reports: malaise


Allergies:  


Coded Allergies:  


     NO KNOWN DRUG ALLERGIES (Unverified  Allergy, Unknown, 1/28/16)





Objective





Last 24 Hour Vital Signs








  Date Time  Temp Pulse Resp B/P (MAP) Pulse Ox O2 Delivery O2 Flow Rate FiO2


 


11/12/17 09:39  92  127/65    


 


11/12/17 08:01 97.6 92 19 127/65 92 Nasal Cannula 2.0 


 


11/12/17 07:43  89 16  97 Nasal Cannula 3.0 32


 


11/12/17 07:38     95 Nasal Cannula 3.0 32


 


11/12/17 07:38  83 18  95 Nasal Cannula 3.0 32


 


11/12/17 07:38      Nasal Cannula 3.0 32


 


11/12/17 04:00 98.0 80 18 135/56 99 Nasal Cannula 2.0 


 


11/12/17 00:00 98.4 84 20 131/52 100 Nasal Cannula 2.0 


 


11/11/17 21:10  96  137/60    


 


11/11/17 20:00 99.3 96 20 137/60 97 Nasal Cannula 2.0 


 


11/11/17 19:37  90 16  98 Nasal Cannula 3.0 32


 


11/11/17 19:28  90 16  97 Nasal Cannula 3.0 32


 


11/11/17 19:27     97 Nasal Cannula 3.0 32


 


11/11/17 19:27      Nasal Cannula 3.0 32


 


11/11/17 16:00 98.2 85 18 142/65 99 Room Air  


 


11/11/17 14:34  95 18  99 Nasal Cannula 3.0 32


 


11/11/17 14:08  82 16  98 Nasal Cannula 3.0 32


 


11/11/17 13:04 97.4 91 20 120/51 99 Room Air  








Laboratory Tests


11/11/17 12:40: 


Urine Color Yellow, Urine Appearance Cloudy, Urine pH 6, Urine Specific Gravity 

1.015, Urine Protein 2+H, Urine Glucose (UA) Negative, Urine Ketones Negative, 

Urine Occult Blood 5+H, Urine Nitrite Negative, Urine Bilirubin 1+H, Urine 

Ictotest Negative, Urine Urobilinogen 8H, Urine Leukocyte Esterase 2+H, Urine 

RBC 15-20H, Urine WBC 10-15H, Urine Squamous Epithelial Cells Few, Urine 

Bacteria Few, Urine Mucus ModerateH


11/12/17 06:29: 


White Blood Count 14.9H, Red Blood Count 3.33L, Hemoglobin 11.4L, Hematocrit 

33.5L, Mean Corpuscular Volume 101H, Mean Corpuscular Hemoglobin 34.1H, Mean 

Corpuscular Hemoglobin Concent 34.0, Red Cell Distribution Width 11.3L, 

Platelet Count 195, Mean Platelet Volume 7.3, Neutrophils (%) (Auto) 81.6H, 

Lymphocytes (%) (Auto) 10.9L, Monocytes (%) (Auto) 7.2, Eosinophils (%) (Auto) 

0.0, Basophils (%) (Auto) 0.3, Sodium Level 143, Potassium Level 3.2L, Chloride 

Level 109H, Carbon Dioxide Level 28, Anion Gap 6, Blood Urea Nitrogen 26H, 

Creatinine 0.6, Estimat Glomerular Filtration Rate , Glucose Level 153H, Uric 

Acid 2.0L, Calcium Level 8.3L, Phosphorus Level 1.0L, Magnesium Level 1.9, 

Total Bilirubin 0.7, Aspartate Amino Transf (AST/SGOT) 68H, Alanine 

Aminotransferase (ALT/SGPT) 71, Alkaline Phosphatase 117H, C-Reactive Protein, 

Quantitative 6.0H, Total Protein 6.1L, Albumin 2.2L, Globulin 3.9, Albumin/

Globulin Ratio 0.6L


Height (Feet):  5


Height (Inches):  4.00


Weight (Pounds):  110


General Appearance:  no apparent distress


Cardiovascular:  regular rhythm


Respiratory/Chest:  decreased breath sounds


Abdomen:  soft


Objective


no change in PE











LEORA SARMIENTO Nov 12, 2017 09:53

## 2017-11-13 VITALS — DIASTOLIC BLOOD PRESSURE: 49 MMHG | SYSTOLIC BLOOD PRESSURE: 106 MMHG

## 2017-11-13 VITALS — DIASTOLIC BLOOD PRESSURE: 61 MMHG | SYSTOLIC BLOOD PRESSURE: 119 MMHG

## 2017-11-13 VITALS — DIASTOLIC BLOOD PRESSURE: 64 MMHG | SYSTOLIC BLOOD PRESSURE: 127 MMHG

## 2017-11-13 VITALS — SYSTOLIC BLOOD PRESSURE: 131 MMHG | DIASTOLIC BLOOD PRESSURE: 70 MMHG

## 2017-11-13 VITALS — SYSTOLIC BLOOD PRESSURE: 99 MMHG | DIASTOLIC BLOOD PRESSURE: 52 MMHG

## 2017-11-13 VITALS — DIASTOLIC BLOOD PRESSURE: 61 MMHG | SYSTOLIC BLOOD PRESSURE: 125 MMHG

## 2017-11-13 VITALS — SYSTOLIC BLOOD PRESSURE: 151 MMHG | DIASTOLIC BLOOD PRESSURE: 71 MMHG

## 2017-11-13 VITALS — SYSTOLIC BLOOD PRESSURE: 137 MMHG | DIASTOLIC BLOOD PRESSURE: 69 MMHG

## 2017-11-13 LAB
ALBUMIN/GLOB SERPL: 0.5 {RATIO} (ref 1–2.7)
ALT SERPL-CCNC: 56 U/L (ref 12–78)
ANION GAP SERPL CALC-SCNC: 9 MMOL/L (ref 5–15)
AST SERPL-CCNC: 41 U/L (ref 15–37)
BASOPHILS NFR BLD MANUAL: 0 % (ref 0–2)
CALCIUM SERPL-MCNC: 8.4 MG/DL (ref 8.5–10.1)
CHLORIDE SERPL-SCNC: 111 MMOL/L (ref 98–107)
CO2 SERPL-SCNC: 26 MMOL/L (ref 21–32)
CREAT SERPL-MCNC: 0.8 MG/DL (ref 0.55–1.3)
CRP SERPL-MCNC: 9.6 MG/DL (ref 0–0.9)
EOSINOPHIL NFR BLD MANUAL: 0 % (ref 0–3)
ERYTHROCYTE [DISTWIDTH] IN BLOOD BY AUTOMATED COUNT: 11.4 % (ref 11.6–14.8)
GLOBULIN SER-MCNC: 3.8 G/DL
MACROCYTES: (no result)
MAGNESIUM SERPL-MCNC: 1.8 MG/DL (ref 1.8–2.4)
MCH RBC QN AUTO: 34.1 PG (ref 27–31)
MCHC RBC AUTO-ENTMCNC: 33.7 G/DL (ref 32–36)
MCV RBC AUTO: 101 FL (ref 80–99)
NEUTS BAND NFR BLD MANUAL: 6 % (ref 0–8)
NEUTS BAND NFR BLD MANUAL: 84 % (ref 45–75)
PHOSPHATE SERPL-MCNC: 1.8 MG/DL (ref 2.5–4.9)
PLAT MORPH BLD: NORMAL
PLATELET # BLD EST: ADEQUATE 10*3/UL
PLATELET # BLD: 182 K/UL (ref 150–450)
PMV BLD AUTO: 7.2 FL (ref 6.5–10.1)
POTASSIUM SERPL-SCNC: 3.3 MMOL/L (ref 3.5–5.1)
PROT SERPL-MCNC: 5.8 G/DL (ref 6.4–8.2)
RBC # BLD AUTO: 3.4 M/UL (ref 4.7–6.1)
SODIUM SERPL-SCNC: 146 MMOL/L (ref 136–145)
TOTAL CELLS COUNTED BLD: 100
VARIANT LYMPHS NFR BLD MANUAL: 6 % (ref 20–45)
WBC # BLD AUTO: 14.4 K/UL (ref 4.8–10.8)

## 2017-11-13 RX ADMIN — METOPROLOL TARTRATE SCH MG: 25 TABLET, FILM COATED ORAL at 10:05

## 2017-11-13 RX ADMIN — SODIUM CHLORIDE SCH MG: 0.9 INJECTION INTRAVENOUS at 14:44

## 2017-11-13 RX ADMIN — ALBUTEROL SULFATE SCH MG: 2.5 SOLUTION RESPIRATORY (INHALATION) at 13:31

## 2017-11-13 RX ADMIN — ALBUTEROL SULFATE SCH MG: 2.5 SOLUTION RESPIRATORY (INHALATION) at 19:00

## 2017-11-13 RX ADMIN — DIBASIC SODIUM PHOSPHATE, MONOBASIC POTASSIUM PHOSPHATE AND MONOBASIC SODIUM PHOSPHATE SCH MG: 852; 155; 130 TABLET ORAL at 14:48

## 2017-11-13 RX ADMIN — DIBASIC SODIUM PHOSPHATE, MONOBASIC POTASSIUM PHOSPHATE AND MONOBASIC SODIUM PHOSPHATE SCH MG: 852; 155; 130 TABLET ORAL at 10:04

## 2017-11-13 RX ADMIN — DIBASIC SODIUM PHOSPHATE, MONOBASIC POTASSIUM PHOSPHATE AND MONOBASIC SODIUM PHOSPHATE SCH MG: 852; 155; 130 TABLET ORAL at 19:05

## 2017-11-13 RX ADMIN — SODIUM CHLORIDE SCH MG: 0.9 INJECTION INTRAVENOUS at 05:29

## 2017-11-13 RX ADMIN — DEXTROSE MONOHYDRATE SCH MLS/HR: 50 INJECTION, SOLUTION INTRAVENOUS at 10:34

## 2017-11-13 RX ADMIN — SODIUM CHLORIDE SCH MG: 0.9 INJECTION INTRAVENOUS at 21:27

## 2017-11-13 RX ADMIN — DEXTROSE MONOHYDRATE SCH MLS/HR: 50 INJECTION, SOLUTION INTRAVENOUS at 21:27

## 2017-11-13 RX ADMIN — ALBUTEROL SULFATE SCH MG: 2.5 SOLUTION RESPIRATORY (INHALATION) at 07:35

## 2017-11-13 RX ADMIN — METOPROLOL TARTRATE SCH MG: 25 TABLET, FILM COATED ORAL at 21:27

## 2017-11-13 RX ADMIN — PANTOPRAZOLE SODIUM SCH MG: 40 INJECTION, POWDER, FOR SOLUTION INTRAVENOUS at 10:05

## 2017-11-13 NOTE — DIAGNOSTIC IMAGING REPORT
Indication: COUGH



Technique: One view of the chest



Comparison: 11/9/2017



Findings: There is fairly extensive infiltrate in the left lung. This is a new

finding. Right lung, bilateral pleural spaces are clear. Heart size is normal



Impression: New finding of fairly extensive left lung infiltrate. Suspect pneumonia

## 2017-11-13 NOTE — GENERAL PROGRESS NOTE
Assessment/Plan


Status:  unchanged


Status Narrative


vomited again yesterday-


WBCs up


Assessment/Plan





status:


Basal Atx on admit likely pneumonia / aspiration


coffee ground vomitus


PEG


UTI, with mild henmaturia


Dehydration , High Na


MalNutrition








Plan:





will start Zosyn-


recheck CXR


K Phos IV-


DC in am with oral antibiotics


Urine c/s- proteus


HHN


Up GT feeding





Subjective


ROS Limited/Unobtainable:  No


Constitutional:  Reports: malaise, weakness


Allergies:  


Coded Allergies:  


     NO KNOWN DRUG ALLERGIES (Unverified  Allergy, Unknown, 1/28/16)





Objective





Last 24 Hour Vital Signs








  Date Time  Temp Pulse Resp B/P (MAP) Pulse Ox O2 Delivery O2 Flow Rate FiO2


 


11/13/17 07:39  100 16  99 Nasal Cannula 2.0 28


 


11/13/17 07:37      Nasal Cannula 2.0 28


 


11/13/17 07:30  98 16  94 Nasal Cannula 2.0 28


 


11/13/17 07:29     94 Nasal Cannula 3.0 32


 


11/13/17 04:22 98.0 90 19 131/70 98 Nasal Cannula  


 


11/13/17 00:00 98.2 94 20 137/69 94 Nasal Cannula 2.0 


 


11/12/17 21:30  96  140/74    


 


11/12/17 20:00 98.2 96 19 140/74 93 Nasal Cannula  


 


11/12/17 19:57  93 16  99 Nasal Cannula 3.0 32


 


11/12/17 19:47  92 16  96 Nasal Cannula 3.0 32


 


11/12/17 19:47      Nasal Cannula 3.0 32


 


11/12/17 19:47     96 Nasal Cannula 3.0 32


 


11/12/17 16:33 97.9 103 22 112/75 95 Nasal Cannula 3.0 


 


11/12/17 13:05  95 18  98 Nasal Cannula 3.0 32


 


11/12/17 13:00  102 16  95 Nasal Cannula 3.0 32


 


11/12/17 12:15 98.0 100 19 161/69 97 Nasal Cannula 2.0 


 


11/12/17 09:39  92  127/65    











Current Medications








 Medications


  (Trade)  Dose


 Ordered  Sig/Benita


 Route


 PRN Reason  Start Time


 Stop Time Status Last Admin


Dose Admin


 


 Albuterol Sulfate


  (Proventil)  2.5 mg  TIDRT


 HHN


   11/10/17 19:00


 11/14/17 18:59  11/13/17 07:35


 


 


 Ceftriaxone


 Sodium 1 gm/


 Dextrose  55 ml @ 


 110 mls/hr  Q24H


 IVPB


   11/10/17 18:00


 11/16/17 17:59  11/12/17 18:16


 


 


 Haloperidol


 Lactate


  (Haldol)  2 mg  Q4H  PRN


 IM


 Agitation  11/10/17 17:00


 12/9/17 16:59   


 


 


 Metoclopramide HCl


  (Reglan)  10 mg  Q8HR


 IVP


   11/12/17 14:30


 12/12/17 14:29  11/13/17 05:29


 


 


 Metoprolol


 Tartrate


  (Lopressor)  25 mg  Q12HR


 GT


   11/10/17 21:00


 12/10/17 20:59  11/12/17 21:30


 


 


 Pantoprazole


  (Protonix)  40 mg  EVERY 12  HOURS


 IVP


   11/10/17 21:00


 12/9/17 20:59  11/12/17 21:30


 


 


 Phosphorus


  (Phospha 250


 Neutral)  250 mg  THREE TIMES A  DAY


 GT


   11/12/17 10:00


 12/12/17 09:59  11/12/17 18:16


 


 


 Potassium


 Phosphate 30 mm/


 Sodium Chloride  285 ml @ 


 47.5 mls/hr  ONCE  ONCE


 IVPB


   11/13/17 08:30


 11/13/17 14:29 UNV  


 





Laboratory Tests


11/13/17 04:55: 


White Blood Count 14.4H, Red Blood Count 3.40L, Hemoglobin 11.6L, Hematocrit 

34.4L, Mean Corpuscular Volume 101H, Mean Corpuscular Hemoglobin 34.1H, Mean 

Corpuscular Hemoglobin Concent 33.7, Red Cell Distribution Width 11.4L, 

Platelet Count 182, Mean Platelet Volume 7.2, Neutrophils (%) (Auto) , 

Lymphocytes (%) (Auto) , Monocytes (%) (Auto) , Eosinophils (%) (Auto) , 

Basophils (%) (Auto) , Neutrophils % (Manual) [Pending], Lymphocytes % (Manual) 

[Pending], Platelet Estimate [Pending], Platelet Morphology [Pending], Sodium 

Level 146H, Potassium Level 3.3L, Chloride Level 111H, Carbon Dioxide Level 26, 

Anion Gap 9, Blood Urea Nitrogen 28H, Creatinine 0.8, Estimat Glomerular 

Filtration Rate , Glucose Level 217H, Calcium Level 8.4L, Phosphorus Level 1.8L

, Magnesium Level 1.8, Total Bilirubin 1.0, Aspartate Amino Transf (AST/SGOT) 

41H, Alanine Aminotransferase (ALT/SGPT) 56, Alkaline Phosphatase 74, C-

Reactive Protein, Quantitative 9.6H, Pro-B-Type Natriuretic Peptide 6448H, 

Total Protein 5.8L, Albumin 2.0L, Globulin 3.8, Albumin/Globulin Ratio 0.5L


Height (Feet):  5


Height (Inches):  4.00


Weight (Pounds):  110


General Appearance:  no apparent distress, lethargic


Cardiovascular:  normal rate


Respiratory/Chest:  decreased breath sounds


Abdomen:  soft, other - GT


Objective


no change in PE











LEORA SARMIENTO Nov 13, 2017 08:28

## 2017-11-13 NOTE — CONSULTATION
History of Present Illness


General


Chief Complaint:  Gastrointestinal Bleed


Reason for Consultation:  inpatient management





Present Illness


HPI


83-year-old male, in vegetative state in [facility]

, DNR and DNI, who had coffee-ground vomitus. the pt was calm during the eval. 

not responds to questions cognitive impairment


Allergies:  


Coded Allergies:  


     NO KNOWN DRUG ALLERGIES (Unverified  Allergy, Unknown, 1/28/16)





Medication History


Scheduled


Petrolatum,White/Lanolin (Vitamin A & D Ointment), 113 GM TP DAILY, (Reported)





Scheduled PRN


Acetaminophen 160MG/5ML* (Acetaminophen*), 20 ML ORAL Q6HR PRN for For Pain, (

Reported)


Albuterol Sulfate* (Albuterol Sulfate Hhn*), 3 ML INH Q6H PRN for Shortness of 

Breath, (Reported)





Patient History


Limited by:  medical condition


History Provided By:  Patient, Medical Record, PMD


Healthcare decision maker





Resuscitation status


Do Not Resuscitate


Advanced Directive on File


Yes





Past Medical/Surgical History


Past Medical/Surgical History:  


(1) Episode of generalized weakness


(2) Abnormal LFTs


(3) Fever


(4) Leukocytosis


(5) Pneumonia


(6) Hyponatremia


(7) Sepsis


(8) sep


(9) Hypoalbuminemia


(10) Anemia


(11) Malfunction of gastrostomy tube


(12) GI bleed


(13) Gastrointestinal hemorrhage


(14) Dehydration


(15) Hypochloremia


(16) Hypernatremia


(17) UTI (urinary tract infection)


(18) CAD (coronary artery disease)


(19) Dementia


(20) HTN (hypertension)


(21) Hematuria





Physical Exam





Last 24 Hour Vital Signs








  Date Time  Temp Pulse Resp B/P (MAP) Pulse Ox O2 Delivery O2 Flow Rate FiO2


 


11/13/17 16:00 97.2 101 19 151/71 94 Nasal Cannula 2.0 


 


11/13/17 13:40  97 16  99 Nasal Cannula 2.0 28 11/13/17 13:30  94 16   Nasal Cannula 2.0 28 11/13/17 12:00 98.9 87 20 119/61 94 Nasal Cannula 2.0 


 


11/13/17 10:05  102  125/61    


 


11/13/17 09:56  102  125/61    


 


11/13/17 08:00 98.8 105 21 99/52 92 Nasal Cannula  


 


11/13/17 07:39  100 16  99 Nasal Cannula 2.0 28


 


11/13/17 07:37      Nasal Cannula 2.0 28


 


11/13/17 07:30  98 16  94 Nasal Cannula 2.0 28


 


11/13/17 07:29     94 Nasal Cannula 3.0 32


 


11/13/17 04:22 98.0 90 19 131/70 98 Nasal Cannula  


 


11/13/17 00:00 98.2 94 20 137/69 94 Nasal Cannula 2.0 


 


11/12/17 21:30  96  140/74    


 


11/12/17 20:00 98.2 96 19 140/74 93 Nasal Cannula  


 


11/12/17 19:57  93 16  99 Nasal Cannula 3.0 32


 


11/12/17 19:47  92 16  96 Nasal Cannula 3.0 32


 


11/12/17 19:47      Nasal Cannula 3.0 32


 


11/12/17 19:47     96 Nasal Cannula 3.0 32

















Intake and Output  


 


 11/13/17 11/14/17





 19:00 07:00


 


Intake Total 230 ml 


 


Output Total 275 ml 


 


Balance -45 ml 


 


  


 


Tube Feeding 200 ml 


 


Blood Product 30 ml 


 


Output Urine Total 275 ml 


 


# Bowel Movements 1 











Laboratory Tests








Test


  11/13/17


04:55


 


White Blood Count


  14.4 K/UL


(4.8-10.8)  H


 


Red Blood Count


  3.40 M/UL


(4.70-6.10)  L


 


Hemoglobin


  11.6 G/DL


(14.2-18.0)  L


 


Hematocrit


  34.4 %


(42.0-52.0)  L


 


Mean Corpuscular Volume


  101 FL (80-99)


H


 


Mean Corpuscular Hemoglobin


  34.1 PG


(27.0-31.0)  H


 


Mean Corpuscular Hemoglobin


Concent 33.7 G/DL


(32.0-36.0)


 


Red Cell Distribution Width


  11.4 %


(11.6-14.8)  L


 


Platelet Count


  182 K/UL


(150-450)


 


Mean Platelet Volume


  7.2 FL


(6.5-10.1)


 


Neutrophils (%) (Auto)


  % (45.0-75.0)


 


 


Lymphocytes (%) (Auto)


  % (20.0-45.0)


 


 


Monocytes (%) (Auto)  % (1.0-10.0)  


 


Eosinophils (%) (Auto)  % (0.0-3.0)  


 


Basophils (%) (Auto)  % (0.0-2.0)  


 


Differential Total Cells


Counted 100  


 


 


Neutrophils % (Manual) 84 % (45-75)  H


 


Lymphocytes % (Manual) 6 % (20-45)  L


 


Monocytes % (Manual) 4 % (1-10)  


 


Eosinophils % (Manual) 0 % (0-3)  


 


Basophils % (Manual) 0 % (0-2)  


 


Band Neutrophils 6 % (0-8)  


 


Platelet Estimate Adequate  


 


Platelet Morphology Normal  


 


Macrocytosis 1+  


 


Sodium Level


  146 MMOL/L


(136-145)  H


 


Potassium Level


  3.3 MMOL/L


(3.5-5.1)  L


 


Chloride Level


  111 MMOL/L


()  H


 


Carbon Dioxide Level


  26 MMOL/L


(21-32)


 


Anion Gap


  9 mmol/L


(5-15)


 


Blood Urea Nitrogen


  28 mg/dL


(7-18)  H


 


Creatinine


  0.8 MG/DL


(0.55-1.30)


 


Estimat Glomerular Filtration


Rate  mL/min (>60)  


 


 


Glucose Level


  217 MG/DL


()  H


 


Calcium Level


  8.4 MG/DL


(8.5-10.1)  L


 


Phosphorus Level


  1.8 MG/DL


(2.5-4.9)  L


 


Magnesium Level


  1.8 MG/DL


(1.8-2.4)


 


Total Bilirubin


  1.0 MG/DL


(0.2-1.0)


 


Aspartate Amino Transf


(AST/SGOT) 41 U/L (15-37)


H


 


Alanine Aminotransferase


(ALT/SGPT) 56 U/L (12-78)


 


 


Alkaline Phosphatase


  74 U/L


()


 


C-Reactive Protein,


Quantitative 9.6 mg/dL


(0.00-0.90)  H


 


Pro-B-Type Natriuretic Peptide


  6448 pg/mL


(0-125)  H


 


Total Protein


  5.8 G/DL


(6.4-8.2)  L


 


Albumin


  2.0 G/DL


(3.4-5.0)  L


 


Globulin 3.8 g/dL  


 


Albumin/Globulin Ratio


  0.5 (1.0-2.7)


L








Height (Feet):  5


Height (Inches):  4.00


Weight (Pounds):  110


Medications





Current Medications








 Medications


  (Trade)  Dose


 Ordered  Sig/Benita


 Route


 PRN Reason  Start Time


 Stop Time Status Last Admin


Dose Admin


 


 Albuterol Sulfate


  (Proventil)  2.5 mg  TIDRT


 N


   11/10/17 19:00


 11/14/17 18:59  11/13/17 13:31


 


 


 Haloperidol


 Lactate


  (Haldol)  2 mg  Q4H  PRN


 IM


 Agitation  11/10/17 17:00


 12/9/17 16:59   


 


 


 Metoclopramide HCl


  (Reglan)  10 mg  Q8HR


 IVP


   11/12/17 14:30


 12/12/17 14:29  11/13/17 14:44


 


 


 Metoprolol


 Tartrate


  (Lopressor)  25 mg  Q12HR


 GT


   11/10/17 21:00


 12/10/17 20:59  11/13/17 10:05


 


 


 Pantoprazole


  (Protonix)  40 mg  DAILY


 IVP


   11/13/17 09:00


 12/9/17 20:59  11/13/17 10:05


 


 


 Phosphorus


  (Phospha 250


 Neutral)  250 mg  THREE TIMES A  DAY


 GT


   11/12/17 10:00


 12/12/17 09:59  11/13/17 19:05


 


 


 Piperacillin Sod/


 Tazobactam Sod


 3.375 gm/Dextrose  55 ml @ 


 13.75 mls/


 hr  Q8HR


 IVPB


   11/13/17 09:30


 11/20/17 09:29  11/13/17 10:34


 

















Huyen Clay M.D. Nov 13, 2017 19:41

## 2017-11-13 NOTE — PULMONOLOGY PROGRESS NOTE
Assessment/Plan


Problems:  


(1) Gastrointestinal hemorrhage


(2) S/P percutaneous endoscopic gastrostomy (PEG) tube placement


(3) Hypernatremia


(4) Hematuria


(5) HTN (hypertension)


(6) CAD (coronary artery disease)


(7) Dementia


Assessment/Plan


wbc remains high


Urine has Proteus sensitive to zosyn


all noted and reviewed


tolerating feeding


ON Ceftriaxone


check electrolytes





check wbc in am





Subjective


ROS Limited/Unobtainable:  No


Constitutional:  Reports: no symptoms


HEENT:  Repors: no symptoms


Respiratory:  Reports: no symptoms


Allergies:  


Coded Allergies:  


     NO KNOWN DRUG ALLERGIES (Unverified  Allergy, Unknown, 1/28/16)





Objective





Last 24 Hour Vital Signs








  Date Time  Temp Pulse Resp B/P (MAP) Pulse Ox O2 Delivery O2 Flow Rate FiO2


 


11/13/17 13:40  97 16  99 Nasal Cannula 2.0 28


 


11/13/17 13:30  94 16   Nasal Cannula 2.0 28


 


11/13/17 12:00 98.9 87 20 119/61 94 Nasal Cannula 2.0 


 


11/13/17 10:05  102  125/61    


 


11/13/17 09:56  102  125/61    


 


11/13/17 08:00 98.8 105 21 99/52 92 Nasal Cannula  


 


11/13/17 07:39  100 16  99 Nasal Cannula 2.0 28


 


11/13/17 07:37      Nasal Cannula 2.0 28


 


11/13/17 07:30  98 16  94 Nasal Cannula 2.0 28


 


11/13/17 07:29     94 Nasal Cannula 3.0 32


 


11/13/17 04:22 98.0 90 19 131/70 98 Nasal Cannula  


 


11/13/17 00:00 98.2 94 20 137/69 94 Nasal Cannula 2.0 


 


11/12/17 21:30  96  140/74    


 


11/12/17 20:00 98.2 96 19 140/74 93 Nasal Cannula  


 


11/12/17 19:57  93 16  99 Nasal Cannula 3.0 32


 


11/12/17 19:47  92 16  96 Nasal Cannula 3.0 32


 


11/12/17 19:47      Nasal Cannula 3.0 32


 


11/12/17 19:47     96 Nasal Cannula 3.0 32


 


11/12/17 16:33 97.9 103 22 112/75 95 Nasal Cannula 3.0 

















Intake and Output  


 


 11/13/17 11/14/17





 19:00 07:00


 


Intake Total 230 ml 


 


Balance 230 ml 


 


  


 


Tube Feeding 200 ml 


 


Blood Product 30 ml 








Objective


General Appearance:  WD/WN, no apparent distress


Lines, tubes and drains:  peripheral, 


HEENT:  normocephalic, anicteric


Neck:  non-tender, normal alignment


Respiratory/Chest:  chest wall non-tender, lungs clear


Cardiovascular/Chest:  normal rate, regular rhythm


Abdomen:  non tender, soft, Gtube in place


Genitourinary/Rectal:  normal genital exam, normal rectal exam


Extremities:  normal range of motion, non-pitting





Microbiology








 Date/Time


Source Procedure


Growth Status


 


 


 11/11/17 12:40


Urine,Clean Catch Urine Culture - Preliminary


NO GROWTH AFTER 24 HOURS Resulted








Laboratory Tests


11/13/17 04:55: 


White Blood Count 14.4H, Red Blood Count 3.40L, Hemoglobin 11.6L, Hematocrit 

34.4L, Mean Corpuscular Volume 101H, Mean Corpuscular Hemoglobin 34.1H, Mean 

Corpuscular Hemoglobin Concent 33.7, Red Cell Distribution Width 11.4L, 

Platelet Count 182, Mean Platelet Volume 7.2, Neutrophils (%) (Auto) , 

Lymphocytes (%) (Auto) , Monocytes (%) (Auto) , Eosinophils (%) (Auto) , 

Basophils (%) (Auto) , Differential Total Cells Counted 100, Neutrophils % (

Manual) 84H, Lymphocytes % (Manual) 6L, Monocytes % (Manual) 4, Eosinophils % (

Manual) 0, Basophils % (Manual) 0, Band Neutrophils 6, Platelet Estimate 

Adequate, Platelet Morphology Normal, Macrocytosis 1+, Sodium Level 146H, 

Potassium Level 3.3L, Chloride Level 111H, Carbon Dioxide Level 26, Anion Gap 9

, Blood Urea Nitrogen 28H, Creatinine 0.8, Estimat Glomerular Filtration Rate , 

Glucose Level 217H, Calcium Level 8.4L, Phosphorus Level 1.8L, Magnesium Level 

1.8, Total Bilirubin 1.0, Aspartate Amino Transf (AST/SGOT) 41H, Alanine 

Aminotransferase (ALT/SGPT) 56, Alkaline Phosphatase 74, C-Reactive Protein, 

Quantitative 9.6H, Pro-B-Type Natriuretic Peptide 6448H, Total Protein 5.8L, 

Albumin 2.0L, Globulin 3.8, Albumin/Globulin Ratio 0.5L





Current Medications








 Medications


  (Trade)  Dose


 Ordered  Sig/Benita


 Route


 PRN Reason  Start Time


 Stop Time Status Last Admin


Dose Admin


 


 Albuterol Sulfate


  (Proventil)  2.5 mg  TIDRT


 HHN


   11/10/17 19:00


 11/14/17 18:59  11/13/17 13:31


 


 


 Haloperidol


 Lactate


  (Haldol)  2 mg  Q4H  PRN


 IM


 Agitation  11/10/17 17:00


 12/9/17 16:59   


 


 


 Metoclopramide HCl


  (Reglan)  10 mg  Q8HR


 IVP


   11/12/17 14:30


 12/12/17 14:29  11/13/17 05:29


 


 


 Metoprolol


 Tartrate


  (Lopressor)  25 mg  Q12HR


 GT


   11/10/17 21:00


 12/10/17 20:59  11/13/17 10:05


 


 


 Pantoprazole


  (Protonix)  40 mg  DAILY


 IVP


   11/13/17 09:00


 12/9/17 20:59  11/13/17 10:05


 


 


 Phosphorus


  (Phospha 250


 Neutral)  250 mg  THREE TIMES A  DAY


 GT


   11/12/17 10:00


 12/12/17 09:59  11/13/17 10:04


 


 


 Piperacillin Sod/


 Tazobactam Sod


 3.375 gm/Dextrose  55 ml @ 


 13.75 mls/


 hr  Q8HR


 IVPB


   11/13/17 09:30


 11/20/17 09:29  11/13/17 10:34


 


 


 Potassium


 Phosphate 30 mm/


 Sodium Chloride  285 ml @ 


 47.5 mls/hr  ONCE  ONCE


 IV


   11/13/17 10:00


 11/13/17 15:59   


 

















DONALD HAND Nov 13, 2017 14:36

## 2017-11-14 VITALS — DIASTOLIC BLOOD PRESSURE: 68 MMHG | SYSTOLIC BLOOD PRESSURE: 154 MMHG

## 2017-11-14 VITALS — DIASTOLIC BLOOD PRESSURE: 90 MMHG | SYSTOLIC BLOOD PRESSURE: 134 MMHG

## 2017-11-14 VITALS — DIASTOLIC BLOOD PRESSURE: 73 MMHG | SYSTOLIC BLOOD PRESSURE: 135 MMHG

## 2017-11-14 VITALS — DIASTOLIC BLOOD PRESSURE: 66 MMHG | SYSTOLIC BLOOD PRESSURE: 136 MMHG

## 2017-11-14 LAB
%HYPO/RBC NFR BLD AUTO: (no result) %
ALBUMIN/GLOB SERPL: 0.6 {RATIO} (ref 1–2.7)
ALT SERPL-CCNC: 50 U/L (ref 12–78)
ANION GAP SERPL CALC-SCNC: 6 MMOL/L (ref 5–15)
ANISOCYTOSIS BLD QL SMEAR: (no result)
AST SERPL-CCNC: 51 U/L (ref 15–37)
BASOPHILS NFR BLD MANUAL: 0 % (ref 0–2)
CALCIUM SERPL-MCNC: 7.8 MG/DL (ref 8.5–10.1)
CHLORIDE SERPL-SCNC: 115 MMOL/L (ref 98–107)
CO2 SERPL-SCNC: 31 MMOL/L (ref 21–32)
CREAT SERPL-MCNC: 0.6 MG/DL (ref 0.55–1.3)
CRP SERPL-MCNC: 12.8 MG/DL (ref 0–0.9)
EOSINOPHIL NFR BLD MANUAL: 0 % (ref 0–3)
ERYTHROCYTE [DISTWIDTH] IN BLOOD BY AUTOMATED COUNT: 11.3 % (ref 11.6–14.8)
GLOBULIN SER-MCNC: 3.5 G/DL
MACROCYTES: (no result)
MCH RBC QN AUTO: 35.1 PG (ref 27–31)
MCHC RBC AUTO-ENTMCNC: 34.7 G/DL (ref 32–36)
MCV RBC AUTO: 101 FL (ref 80–99)
NEUTS BAND NFR BLD MANUAL: 0 % (ref 0–8)
NEUTS BAND NFR BLD MANUAL: 85 % (ref 45–75)
PHOSPHATE SERPL-MCNC: 2.6 MG/DL (ref 2.5–4.9)
PLAT MORPH BLD: NORMAL
PLATELET # BLD EST: ADEQUATE 10*3/UL
PLATELET # BLD: 157 K/UL (ref 150–450)
PMV BLD AUTO: 7.8 FL (ref 6.5–10.1)
POTASSIUM SERPL-SCNC: 3.8 MMOL/L (ref 3.5–5.1)
PROT SERPL-MCNC: 5.5 G/DL (ref 6.4–8.2)
RBC # BLD AUTO: 3.12 M/UL (ref 4.7–6.1)
SODIUM SERPL-SCNC: 152 MMOL/L (ref 136–145)
TOTAL CELLS COUNTED BLD: 100
VARIANT LYMPHS NFR BLD MANUAL: 10 % (ref 20–45)
WBC # BLD AUTO: 14 K/UL (ref 4.8–10.8)

## 2017-11-14 RX ADMIN — METOPROLOL TARTRATE SCH MG: 25 TABLET, FILM COATED ORAL at 09:39

## 2017-11-14 RX ADMIN — DEXTROSE MONOHYDRATE SCH MLS/HR: 50 INJECTION, SOLUTION INTRAVENOUS at 14:07

## 2017-11-14 RX ADMIN — SODIUM CHLORIDE SCH MG: 0.9 INJECTION INTRAVENOUS at 05:05

## 2017-11-14 RX ADMIN — DIBASIC SODIUM PHOSPHATE, MONOBASIC POTASSIUM PHOSPHATE AND MONOBASIC SODIUM PHOSPHATE SCH MG: 852; 155; 130 TABLET ORAL at 09:39

## 2017-11-14 RX ADMIN — DEXTROSE MONOHYDRATE SCH MLS/HR: 50 INJECTION, SOLUTION INTRAVENOUS at 05:05

## 2017-11-14 RX ADMIN — PANTOPRAZOLE SODIUM SCH MG: 40 INJECTION, POWDER, FOR SOLUTION INTRAVENOUS at 09:39

## 2017-11-14 RX ADMIN — ALBUTEROL SULFATE SCH MG: 2.5 SOLUTION RESPIRATORY (INHALATION) at 13:18

## 2017-11-14 RX ADMIN — DIBASIC SODIUM PHOSPHATE, MONOBASIC POTASSIUM PHOSPHATE AND MONOBASIC SODIUM PHOSPHATE SCH MG: 852; 155; 130 TABLET ORAL at 17:49

## 2017-11-14 RX ADMIN — DIBASIC SODIUM PHOSPHATE, MONOBASIC POTASSIUM PHOSPHATE AND MONOBASIC SODIUM PHOSPHATE SCH MG: 852; 155; 130 TABLET ORAL at 14:07

## 2017-11-14 RX ADMIN — ALBUTEROL SULFATE SCH MG: 2.5 SOLUTION RESPIRATORY (INHALATION) at 07:08

## 2017-11-14 RX ADMIN — SODIUM CHLORIDE SCH MG: 0.9 INJECTION INTRAVENOUS at 14:07

## 2017-11-14 NOTE — GENERAL PROGRESS NOTE
Assessment/Plan


Assessment/Plan





status:


Basal Atx on admit likely pneumonia / aspiration


cxr left side infiltrate


coffee ground vomitus


PEG


UTI, with mild henmaturia


Dehydration , High Na


MalNutrition








Plan:





on Zosyn-


previously on Hospice


now DNR DNI


DPOA conversed with Dr Parker and agrees on Hospice


DC to [facility] on Hospice, Augmentin


Urine c/s- proteus


HHN


Up GT feeding





Subjective


ROS Limited/Unobtainable:  No


Constitutional:  Reports: malaise, weakness


Allergies:  


Coded Allergies:  


     NO KNOWN DRUG ALLERGIES (Unverified  Allergy, Unknown, 1/28/16)





Objective





Last 24 Hour Vital Signs








  Date Time  Temp Pulse Resp B/P (MAP) Pulse Ox O2 Delivery O2 Flow Rate FiO2


 


11/14/17 12:00 97.7 78 20 136/66 100 Nasal Cannula 4.0 


 


11/14/17 09:39  93  135/73    


 


11/14/17 08:00 97.3 93 20 135/73 100 Nasal Cannula 4.0 


 


11/14/17 07:18  89 20  99 Nasal Cannula 4.0 36


 


11/14/17 07:13      Nasal Cannula 4.0 36


 


11/14/17 07:06  84 18  95 Nasal Cannula 4.0 36


 


11/14/17 07:05     95 Nasal Cannula 4.0 36


 


11/14/17 04:00 97.9 76 20 154/68 97 Nasal Cannula  


 


11/13/17 23:36 97.7 95 20 106/49 93 Room Air  


 


11/13/17 21:27  115  127/64    


 


11/13/17 20:12      Nasal Cannula 4.0 


 


11/13/17 20:12      Nasal Cannula  


 


11/13/17 20:11      Nasal Cannula 4.0 36


 


11/13/17 20:11     97 Nasal Cannula 4.0 36


 


11/13/17 19:34 99.7 115 18 127/64 96 Room Air  


 


11/13/17 16:00 97.2 101 19 151/71 94 Nasal Cannula 2.0 


 


11/13/17 13:40  97 16  99 Nasal Cannula 2.0 28


 


11/13/17 13:30  94 16   Nasal Cannula 2.0 28

















Intake and Output  


 


 11/14/17 11/15/17





 19:00 07:00


 


Intake Total 301.25 ml 


 


Balance 301.25 ml 


 


  


 


Free Water 60 ml 


 


IV Total 41.25 ml 


 


Tube Feeding 200 ml 








Laboratory Tests


11/14/17 05:40: 


White Blood Count 14.0H, Red Blood Count 3.12L, Hemoglobin 11.0L, Hematocrit 

31.6L, Mean Corpuscular Volume 101H, Mean Corpuscular Hemoglobin 35.1H, Mean 

Corpuscular Hemoglobin Concent 34.7, Red Cell Distribution Width 11.3L, 

Platelet Count 157, Mean Platelet Volume 7.8, Neutrophils (%) (Auto) , 

Lymphocytes (%) (Auto) , Monocytes (%) (Auto) , Eosinophils (%) (Auto) , 

Basophils (%) (Auto) , Differential Total Cells Counted 100, Neutrophils % (

Manual) 85H, Lymphocytes % (Manual) 10L, Monocytes % (Manual) 5, Eosinophils % (

Manual) 0, Basophils % (Manual) 0, Band Neutrophils 0, Platelet Estimate 

Adequate, Platelet Morphology Normal, Hypochromasia 1+, Anisocytosis 1+, 

Macrocytosis 1+, Sodium Level 152H, Potassium Level 3.8, Chloride Level 115H, 

Carbon Dioxide Level 31, Anion Gap 6, Blood Urea Nitrogen 26H, Creatinine 0.6, 

Estimat Glomerular Filtration Rate , Glucose Level 147H, Calcium Level 7.8L, 

Phosphorus Level 2.6, Magnesium Level 2.1, Total Bilirubin 0.8, Aspartate Amino 

Transf (AST/SGOT) 51H, Alanine Aminotransferase (ALT/SGPT) 50, Alkaline 

Phosphatase 84, C-Reactive Protein, Quantitative 12.8H, Pro-B-Type Natriuretic 

Peptide 1753H, Total Protein 5.5L, Albumin 2.0L, Globulin 3.5, Albumin/Globulin 

Ratio 0.6L


Height (Feet):  5


Height (Inches):  4.00


Weight (Pounds):  110


General Appearance:  no apparent distress, lethargic


Cardiovascular:  normal rate


Respiratory/Chest:  decreased breath sounds


Abdomen:  soft, other - GT +


Extremities:  other - contracted


Objective


no change in PE











LEORA SARMIENTO Nov 14, 2017 13:12

## 2017-11-14 NOTE — WOUND CARE CONSULTATION
Wound Assessment


Wound Assessment #1:  


   Wound Number:  1


   Wound Present on Admission:  No


   New Wound:  Yes


   Status Change of Wound:  No


   Wound Location Body Site Modif:  left


   Wound Location Body Site:  shoulder


   Wound Type:  pressure ulcer


   Marilyn Test:  Does not Marilyn


   Pressure Ulcer Stage:  II


   Wound Thickness:  Partial Thickness


   Wound Length:  0.5


   Wound Width:  0.4


   Wound Depth:  less than 0.1


   Percent of Wound Pink/Red:  100


   Wound Drainage Amount:  None


   Wound Drainage Odor:  None/Absent


   Tissue Surrounding Wound:  DTI surrounding skin L 2.5 x W 3.0


   Wound General Appearance:  Reddened


Wound Assessment #2:  


   Wound Number:  2


   Wound Present on Admission:  No


   New Wound:  Yes


   Status Change of Wound:  No


   Wound Location Body Site Modif:  right, lateral


   Wound Location Body Site:  toe - 1st


   Wound Type:  pressure ulcer


   Marilyn Test:  Does not Marilyn


   Pressure Ulcer Stage:  Deep Tissue Injury


   Wound Thickness:  Full Thickness


   Wound Length:  0.8


   Wound Width:  0.8


   Wound Depth:  utd


   Percent of Wound Purple/Maroon:  100


   Wound Drainage Amount:  None


   Wound Drainage Odor:  None/Absent


   Tissue Surrounding Wound:  Erythemic


   Wound General Appearance:  Reddened - purple


Wound Assessment #3:  


   Wound Number:  3


   Wound Present on Admission:  No


   New Wound:  Yes


   Status Change of Wound:  No


   Wound Location Body Site Modif:  right


   Wound Location Body Site:  metatarsal head - 1st


   Wound Type:  pressure ulcer


   Marilyn Test:  Does not Marilyn


   Pressure Ulcer Stage:  I


   Wound Thickness:  Full Thickness


   Wound Length:  1.0


   Wound Width:  1.0


   Percent of Wound Pink/Red:  100


   Wound Drainage Amount:  None


   Wound Drainage Odor:  None/Absent


   Tissue Surrounding Wound:  Erythemic


   Wound General Appearance:  Reddened - purple


Wound Assessment #4:  


   Wound Number:  4


   Wound Present on Admission:  No


   New Wound:  Yes


   Status Change of Wound:  No


   Wound Location Body Site Modif:  left, anterior


   Wound Location Body Site:  knee


   Wound Type:  traumatic injury


   Marilyn Test:  Does not Marilyn


   Traumatic Injury Wounds:  Skin Tear - scattered


   Wound Thickness:  Partial Thickness


   Percent of Wound Pink/Red:  100


   Wound Drainage Description:  Serosanguineous


   Wound Drainage Amount:  Scant


   Wound Drainage Odor:  None/Absent


   Tissue Surrounding Wound:  Erythemic


   Wound General Appearance:  Reddened, Draining


Wound Comment





#1 Right lateral 1st toe DTI pressure ulcer


#2 Right lateral 1st metatarsal head stage I pressure ulcer


#3 Right anterior knee scattered skin tears which looks like open scab


#4 Left shoulder DTI reveling as stage II pressure ulcer, surrounding skin DTI 

pressure ulcer


#5 Left lower leg with dry scabs





Recommendation





-Local wound care per protocol


-Keep clean and dry


-Turn and reposition


-Optimize nutrition


-Heel protector on both heels 


-Offload both heels 


-Low air loss mattress


-Assess and f/u accordingly for any changes











LU NORTH RN Nov 14, 2017 11:56

## 2017-11-14 NOTE — GENERAL PROGRESS NOTE
Assessment/Plan


Status:  stable





Subjective


Date patient seen:  Nov 12, 2017


Neurologic/Psychiatric:  Reports: anxiety, emotional problems


Allergies:  


Coded Allergies:  


     NO KNOWN DRUG ALLERGIES (Unverified  Allergy, Unknown, 1/28/16)





Objective





Last 24 Hour Vital Signs








  Date Time  Temp Pulse Resp B/P (MAP) Pulse Ox O2 Delivery O2 Flow Rate FiO2


 


11/14/17 16:00 97.9 78 19 134/90 97 Nasal Cannula 4.0 


 


11/14/17 13:25  94 20  99 Nasal Cannula 4.0 36


 


11/14/17 13:15  80 18   Nasal Cannula 4.0 36


 


11/14/17 12:00 97.7 78 20 136/66 100 Nasal Cannula 4.0 


 


11/14/17 09:39  93  135/73    


 


11/14/17 08:00 97.3 93 20 135/73 100 Nasal Cannula 4.0 


 


11/14/17 07:18  89 20  99 Nasal Cannula 4.0 36


 


11/14/17 07:13      Nasal Cannula 4.0 36


 


11/14/17 07:06  84 18  95 Nasal Cannula 4.0 36


 


11/14/17 07:05     95 Nasal Cannula 4.0 36


 


11/14/17 04:00 97.9 76 20 154/68 97 Nasal Cannula  


 


11/13/17 23:36 97.7 95 20 106/49 93 Room Air  

















Intake and Output  


 


 11/14/17 11/15/17





 19:00 07:00


 


Intake Total 301.25 ml 


 


Balance 301.25 ml 


 


  


 


Free Water 60 ml 


 


IV Total 41.25 ml 


 


Tube Feeding 200 ml 








Laboratory Tests


11/14/17 05:40: 


White Blood Count 14.0H, Red Blood Count 3.12L, Hemoglobin 11.0L, Hematocrit 

31.6L, Mean Corpuscular Volume 101H, Mean Corpuscular Hemoglobin 35.1H, Mean 

Corpuscular Hemoglobin Concent 34.7, Red Cell Distribution Width 11.3L, 

Platelet Count 157, Mean Platelet Volume 7.8, Neutrophils (%) (Auto) , 

Lymphocytes (%) (Auto) , Monocytes (%) (Auto) , Eosinophils (%) (Auto) , 

Basophils (%) (Auto) , Differential Total Cells Counted 100, Neutrophils % (

Manual) 85H, Lymphocytes % (Manual) 10L, Monocytes % (Manual) 5, Eosinophils % (

Manual) 0, Basophils % (Manual) 0, Band Neutrophils 0, Platelet Estimate 

Adequate, Platelet Morphology Normal, Hypochromasia 1+, Anisocytosis 1+, 

Macrocytosis 1+, Sodium Level 152H, Potassium Level 3.8, Chloride Level 115H, 

Carbon Dioxide Level 31, Anion Gap 6, Blood Urea Nitrogen 26H, Creatinine 0.6, 

Estimat Glomerular Filtration Rate , Glucose Level 147H, Calcium Level 7.8L, 

Phosphorus Level 2.6, Magnesium Level 2.1, Total Bilirubin 0.8, Aspartate Amino 

Transf (AST/SGOT) 51H, Alanine Aminotransferase (ALT/SGPT) 50, Alkaline 

Phosphatase 84, C-Reactive Protein, Quantitative 12.8H, Pro-B-Type Natriuretic 

Peptide 1753H, Total Protein 5.5L, Albumin 2.0L, Globulin 3.5, Albumin/Globulin 

Ratio 0.6L


Height (Feet):  5


Height (Inches):  4.00


Weight (Pounds):  110


General Appearance:  no apparent distress, lethargic, confused, agitated


Neurologic:  disoriented, depressed affect











Huyen Clay M.D. Nov 14, 2017 21:36

## 2017-11-14 NOTE — PULMONOLOGY PROGRESS NOTE
Assessment/Plan


Problems:  


(1) Gastrointestinal hemorrhage


(2) S/P percutaneous endoscopic gastrostomy (PEG) tube placement


(3) Hypernatremia


(4) Hematuria


(5) HTN (hypertension)


(6) CAD (coronary artery disease)


(7) Dementia


Assessment/Plan


wbc remains high


Urine has Proteus sensitive to zosyn


all noted and reviewed


tolerating feeding


ON Ceftriaxone





d/w DPOA, she agreed with Hospice upon discharge,  She might consider stopping 

feeding as well.





Subjective


ROS Limited/Unobtainable:  No


Constitutional:  Reports: no symptoms


HEENT:  Repors: no symptoms


Respiratory:  Reports: no symptoms


Allergies:  


Coded Allergies:  


     NO KNOWN DRUG ALLERGIES (Unverified  Allergy, Unknown, 1/28/16)





Objective





Last 24 Hour Vital Signs








  Date Time  Temp Pulse Resp B/P (MAP) Pulse Ox O2 Delivery O2 Flow Rate FiO2


 


11/14/17 13:25  94 20  99 Nasal Cannula 4.0 36


 


11/14/17 13:15  80 18   Nasal Cannula 4.0 36


 


11/14/17 12:00 97.7 78 20 136/66 100 Nasal Cannula 4.0 


 


11/14/17 09:39  93  135/73    


 


11/14/17 08:00 97.3 93 20 135/73 100 Nasal Cannula 4.0 


 


11/14/17 07:18  89 20  99 Nasal Cannula 4.0 36


 


11/14/17 07:13      Nasal Cannula 4.0 36


 


11/14/17 07:06  84 18  95 Nasal Cannula 4.0 36


 


11/14/17 07:05     95 Nasal Cannula 4.0 36


 


11/14/17 04:00 97.9 76 20 154/68 97 Nasal Cannula  


 


11/13/17 23:36 97.7 95 20 106/49 93 Room Air  


 


11/13/17 21:27  115  127/64    


 


11/13/17 20:12      Nasal Cannula 4.0 


 


11/13/17 20:12      Nasal Cannula  


 


11/13/17 20:11      Nasal Cannula 4.0 36


 


11/13/17 20:11     97 Nasal Cannula 4.0 36


 


11/13/17 19:34 99.7 115 18 127/64 96 Room Air  


 


11/13/17 16:00 97.2 101 19 151/71 94 Nasal Cannula 2.0 

















Intake and Output  


 


 11/14/17 11/15/17





 19:00 07:00


 


Intake Total 301.25 ml 


 


Balance 301.25 ml 


 


  


 


Free Water 60 ml 


 


IV Total 41.25 ml 


 


Tube Feeding 200 ml 








Objective


General Appearance:  WD/WN, no apparent distress


Lines, tubes and drains:  peripheral, 


HEENT:  normocephalic, anicteric


Neck:  non-tender, normal alignment


Respiratory/Chest:  chest wall non-tender, lungs clear


Cardiovascular/Chest:  normal rate, regular rhythm


Abdomen:  non tender, soft, Gtube in place


Genitourinary/Rectal:  normal genital exam, normal rectal exam


Extremities:  normal range of motion, non-pitting


Laboratory Tests


11/14/17 05:40: 


White Blood Count 14.0H, Red Blood Count 3.12L, Hemoglobin 11.0L, Hematocrit 

31.6L, Mean Corpuscular Volume 101H, Mean Corpuscular Hemoglobin 35.1H, Mean 

Corpuscular Hemoglobin Concent 34.7, Red Cell Distribution Width 11.3L, 

Platelet Count 157, Mean Platelet Volume 7.8, Neutrophils (%) (Auto) , 

Lymphocytes (%) (Auto) , Monocytes (%) (Auto) , Eosinophils (%) (Auto) , 

Basophils (%) (Auto) , Differential Total Cells Counted 100, Neutrophils % (

Manual) 85H, Lymphocytes % (Manual) 10L, Monocytes % (Manual) 5, Eosinophils % (

Manual) 0, Basophils % (Manual) 0, Band Neutrophils 0, Platelet Estimate 

Adequate, Platelet Morphology Normal, Hypochromasia 1+, Anisocytosis 1+, 

Macrocytosis 1+, Sodium Level 152H, Potassium Level 3.8, Chloride Level 115H, 

Carbon Dioxide Level 31, Anion Gap 6, Blood Urea Nitrogen 26H, Creatinine 0.6, 

Estimat Glomerular Filtration Rate , Glucose Level 147H, Calcium Level 7.8L, 

Phosphorus Level 2.6, Magnesium Level 2.1, Total Bilirubin 0.8, Aspartate Amino 

Transf (AST/SGOT) 51H, Alanine Aminotransferase (ALT/SGPT) 50, Alkaline 

Phosphatase 84, C-Reactive Protein, Quantitative 12.8H, Pro-B-Type Natriuretic 

Peptide 1753H, Total Protein 5.5L, Albumin 2.0L, Globulin 3.5, Albumin/Globulin 

Ratio 0.6L





Current Medications








 Medications


  (Trade)  Dose


 Ordered  Sig/Benita


 Route


 PRN Reason  Start Time


 Stop Time Status Last Admin


Dose Admin


 


 Acetaminophen


  (Tylenol)  650 mg  Q4H  PRN


 GT


 Mild Pain/Temp > 100.5  11/13/17 21:15


 12/13/17 21:14   


 


 


 Albuterol Sulfate


  (Proventil)  2.5 mg  TIDRT


 HHN


   11/10/17 19:00


 11/14/17 18:59  11/14/17 13:18


 


 


 Haloperidol


 Lactate


  (Haldol)  2 mg  Q4H  PRN


 IM


 Agitation  11/10/17 17:00


 12/9/17 16:59   


 


 


 Metoclopramide HCl


  (Reglan)  10 mg  Q8HR


 IVP


   11/12/17 14:30


 12/12/17 14:29  11/14/17 14:07


 


 


 Metoprolol


 Tartrate


  (Lopressor)  50 mg  Q12HR


 GT


   11/13/17 21:30


 12/13/17 21:29  11/14/17 09:39


 


 


 Pantoprazole


  (Protonix)  40 mg  DAILY


 IVP


   11/13/17 09:00


 12/9/17 20:59  11/14/17 09:39


 


 


 Phosphorus


  (Phospha 250


 Neutral)  250 mg  THREE TIMES A  DAY


 GT


   11/12/17 10:00


 12/12/17 09:59  11/14/17 14:07


 


 


 Piperacillin Sod/


 Tazobactam Sod


 3.375 gm/Dextrose  55 ml @ 


 13.75 mls/


 hr  Q8HR


 IVPB


   11/13/17 09:30


 11/20/17 09:29  11/14/17 14:07


 

















DONALD HAND Nov 14, 2017 15:01

## 2017-11-14 NOTE — DISCHARGE INSTRUCTIONS
Discharge Instructions


Discharge Instructions


Follow up with:  Hospice





For Congestive Heart Failure


Reminder


Report to your physician any weight gain of 5 pounds or more in one week.











LEORA SARMIENTO Nov 14, 2017 13:14

## 2017-11-14 NOTE — GERIATRIC PROGRESS NOTE
Subjective


Functional Changes:  11/14/17


Mood/Memory:  Reports: prior hx, anxiety





Geriatric


Geriatric





Last 24 Hour Vital Signs








  Date Time  Temp Pulse Resp B/P (MAP) Pulse Ox O2 Delivery O2 Flow Rate FiO2


 


11/14/17 16:00 97.9 78 19 134/90 97 Nasal Cannula 4.0 


 


11/14/17 13:25  94 20  99 Nasal Cannula 4.0 36


 


11/14/17 13:15  80 18   Nasal Cannula 4.0 36


 


11/14/17 12:00 97.7 78 20 136/66 100 Nasal Cannula 4.0 


 


11/14/17 09:39  93  135/73    


 


11/14/17 08:00 97.3 93 20 135/73 100 Nasal Cannula 4.0 


 


11/14/17 07:18  89 20  99 Nasal Cannula 4.0 36


 


11/14/17 07:13      Nasal Cannula 4.0 36


 


11/14/17 07:06  84 18  95 Nasal Cannula 4.0 36


 


11/14/17 07:05     95 Nasal Cannula 4.0 36


 


11/14/17 04:00 97.9 76 20 154/68 97 Nasal Cannula  


 


11/13/17 23:36 97.7 95 20 106/49 93 Room Air  

















Intake and Output  


 


 11/14/17 11/15/17





 19:00 07:00


 


Intake Total 301.25 ml 


 


Balance 301.25 ml 


 


  


 


Free Water 60 ml 


 


IV Total 41.25 ml 


 


Tube Feeding 200 ml 











Laboratory Tests








Test


  11/14/17


05:40


 


White Blood Count


  14.0 K/UL


(4.8-10.8)  H


 


Red Blood Count


  3.12 M/UL


(4.70-6.10)  L


 


Hemoglobin


  11.0 G/DL


(14.2-18.0)  L


 


Hematocrit


  31.6 %


(42.0-52.0)  L


 


Mean Corpuscular Volume


  101 FL (80-99)


H


 


Mean Corpuscular Hemoglobin


  35.1 PG


(27.0-31.0)  H


 


Mean Corpuscular Hemoglobin


Concent 34.7 G/DL


(32.0-36.0)


 


Red Cell Distribution Width


  11.3 %


(11.6-14.8)  L


 


Platelet Count


  157 K/UL


(150-450)


 


Mean Platelet Volume


  7.8 FL


(6.5-10.1)


 


Neutrophils (%) (Auto)


  % (45.0-75.0)


 


 


Lymphocytes (%) (Auto)


  % (20.0-45.0)


 


 


Monocytes (%) (Auto)  % (1.0-10.0)  


 


Eosinophils (%) (Auto)  % (0.0-3.0)  


 


Basophils (%) (Auto)  % (0.0-2.0)  


 


Differential Total Cells


Counted 100  


 


 


Neutrophils % (Manual) 85 % (45-75)  H


 


Lymphocytes % (Manual) 10 % (20-45)  L


 


Monocytes % (Manual) 5 % (1-10)  


 


Eosinophils % (Manual) 0 % (0-3)  


 


Basophils % (Manual) 0 % (0-2)  


 


Band Neutrophils 0 % (0-8)  


 


Platelet Estimate Adequate  


 


Platelet Morphology Normal  


 


Hypochromasia 1+  


 


Anisocytosis 1+  


 


Macrocytosis 1+  


 


Sodium Level


  152 MMOL/L


(136-145)  H


 


Potassium Level


  3.8 MMOL/L


(3.5-5.1)


 


Chloride Level


  115 MMOL/L


()  H


 


Carbon Dioxide Level


  31 MMOL/L


(21-32)


 


Anion Gap


  6 mmol/L


(5-15)


 


Blood Urea Nitrogen


  26 mg/dL


(7-18)  H


 


Creatinine


  0.6 MG/DL


(0.55-1.30)


 


Estimat Glomerular Filtration


Rate  mL/min (>60)  


 


 


Glucose Level


  147 MG/DL


()  H


 


Calcium Level


  7.8 MG/DL


(8.5-10.1)  L


 


Phosphorus Level


  2.6 MG/DL


(2.5-4.9)


 


Magnesium Level


  2.1 MG/DL


(1.8-2.4)


 


Total Bilirubin


  0.8 MG/DL


(0.2-1.0)


 


Aspartate Amino Transf


(AST/SGOT) 51 U/L (15-37)


H


 


Alanine Aminotransferase


(ALT/SGPT) 50 U/L (12-78)


 


 


Alkaline Phosphatase


  84 U/L


()


 


C-Reactive Protein,


Quantitative 12.8 mg/dL


(0.00-0.90)  H


 


Pro-B-Type Natriuretic Peptide


  1753 pg/mL


(0-125)  H


 


Total Protein


  5.5 G/DL


(6.4-8.2)  L


 


Albumin


  2.0 G/DL


(3.4-5.0)  L


 


Globulin 3.5 g/dL  


 


Albumin/Globulin Ratio


  0.6 (1.0-2.7)


L








Height (Feet):  5


Height (Inches):  4.00


Weight (Pounds):  110


General Appearance:  poor eye contact











Huyen Clay M.D. Nov 14, 2017 21:37

## 2017-11-16 NOTE — DISCHARGE SUMMARY 2 SIG
DATE OF ADMISSION:  11/09/2017



DATE OF DISCHARGE:  11/14/2017



CONSULTANTS:

1. Lina Parker M.D.

2. Huyen Clay M.D.

3. Flako Akers M.D.



BRIEF HOSPITAL COURSE:  The patient is an 83-year-old male, in vegetative

state, who resides in [facility].  DNR and

DNI.  He had coffee-ground vomitus and was sent to emergency room for

further evaluation.  He was evaluated at ED and was found to have

hypernatremia with sodium of 151. Chloride was 87.  Urine with positive

nitrites, 3+ leukocyte esterase, urine WBCs 5 to 10, and urine RBCs 20 to

30. Chest x-ray done, showed mild left basal atelectasis.  He was given IV

hydration and Protonix 40 mg IV push q.12 h.  He was started on Rocephin

and was initially placed on NPO.  Fuchs catheter was inserted and had mild

gross hematuria. Urine culture showed growth of Proteus mirabilis.  He 

was eventually started on G-tube feed. Chest x-ray showed left lung

infiltrate.  He was continued on Zosyn and ceftriaxone.  He had multiple 

pressure ulcer and was given wound care.  The patient was

previously on hospice and DPOA agreed to continue hospice care.  He was

eventually discharged back to [facility].  Continue with G-tube

feed and Augmentin as outpatient.



FINAL DIAGNOSES:

1. Likely aspiration pneumonia.

2. Coffee-ground vomitus.

3. Dysphagia with percutaneous endoscopic gastrostomy tube.

4. Urinary tract infection with Proteus.

5. Mild hematuria.

6. Dehydration with high sodium.

7. Malnutrition.

8. Dementia.

9. Coronary artery disease.

10. Hypertension.

11. Multiple pressure ulcer.  Refer to wound documentation.



DISPOSITION:  The patient was discharged to [facility] with hospice.

 



DISCHARGE MEDICATIONS:  Refer to medication list.







  ______________________________________________

  Morales Hill M.D.



I have been assigned to dictate discharge summary on this account and I

was not involved in the patient's management.



  ______________________________________________

  Farzaneh Belle N.P.





DR:  Wale

D:  11/16/2017 14:52

T:  11/16/2017 21:54

JOB#:  [id number]

CC:



GREG

## 2017-11-16 NOTE — DISCHARGE SUMMARY
Discharge Summary


Hospital Course


Date of Admission


Nov 9, 2017 at 11:48


Date of Discharge


Nov 14, 2017 at 18:37


Admitting Diagnosis


GI Bleeding


HPI


Arturo Ordonez is a 83 year old male who was admitted on Nov 9, 2017 at 11:

48 for Gi Bleed


Hospital Course


[id number]





Discharge


Discharge Disposition


Patient was discharged to [facility] with Hospice


Discharge Diagnoses:  





Discharge Instructions


Discharge Instructions


Follow up with:  Farzaneh Zuniga NP Nov 16, 2017 14:53

## 2017-11-19 NOTE — CARDIOLOGY REPORT
--------------- APPROVED REPORT --------------





EKG Measurement

Heart [id number]

IN [id number]

[id number]

[id number]

[id number]





Sinus tachycardia 

Low voltage QRS

Possible Lateral infarct, age undetermined

Abnormal ECG

## 2018-07-30 ENCOUNTER — HOSPITAL ENCOUNTER (EMERGENCY)
Dept: HOSPITAL 72 - EMR | Age: 83
Discharge: NURSING, REHAB OR LONG-TERM CARE | End: 2018-07-30
Payer: MEDICARE

## 2018-07-30 VITALS — DIASTOLIC BLOOD PRESSURE: 65 MMHG | SYSTOLIC BLOOD PRESSURE: 102 MMHG

## 2018-07-30 VITALS — WEIGHT: 125 LBS | BODY MASS INDEX: 20.09 KG/M2 | HEIGHT: 66 IN

## 2018-07-30 VITALS — DIASTOLIC BLOOD PRESSURE: 69 MMHG | SYSTOLIC BLOOD PRESSURE: 95 MMHG

## 2018-07-30 DIAGNOSIS — K94.23: Primary | ICD-10-CM

## 2018-07-30 PROCEDURE — 99283 EMERGENCY DEPT VISIT LOW MDM: CPT

## 2018-07-30 PROCEDURE — 74018 RADEX ABDOMEN 1 VIEW: CPT

## 2018-07-30 NOTE — DIAGNOSTIC IMAGING REPORT
Indication: Status post gastrostomy replacement

 

Technique: Supine view of the abdomen after injection of water-soluble contrast into

gastrostomy

 

Comparison: 11/2/2017

 

Findings: Contrast opacifies the stomach. No contrast extravasation is demonstrated.

The bowel gas pattern is unremarkable. No significant interim change

 

Impression: Satisfactory position of gastrostomy tube

## 2018-07-30 NOTE — EMERGENCY ROOM REPORT
History of Present Illness


General


Chief Complaint:  Malfunctioning Gastric Tube


Source:  Medical Record, EMS





Present Illness


HPI


83-year-old male presents ED for G-tube placement.  Was found dislodged by 

nursing staff today at skilled nursing facility.  Placed with Huynh catheter.  

Upon arrival patient no distress.  Patient is nonverbal at baseline and unable 

to provide any additional history at this time.  No other aggravating relieving 

factors.  No other associated symptoms


Allergies:  


Coded Allergies:  


     NO KNOWN DRUG ALLERGIES (Unverified  Allergy, Unknown, 1/28/16)





Patient History


Past Medical History:  HTN, CAD, CVA/TIA, dementia


Past Surgical History:  other - Gtube


Pertinent Family History:  none


Social History:  Denies: smoking, alcohol use, drug use


Immunizations:  UTD


Reviewed Nursing Documentation:  PMH: Agreed; PSxH: Agreed





Nursing Documentation-PMH


Past Medical History:  No History, Except For


Hx Cardiac Problems:  Yes - anemia, angina,CAD


Hx Hypertension:  Yes


Hx Pacemaker:  No


Hx Asthma:  No


Hx COPD:  No


Hx Diabetes:  No


Hx Cancer:  No


Hx Gastrointestinal Problems:  Yes - Dysphagia, G-tube


Hx Dialysis:  No


Hx Neurological Problems:  Yes


Hx Cerebrovascular Accident:  Yes - TIA


Hx Transient Ischemic Attacks:  Yes - no residual


Hx Dementia:  Yes


Hx Seizures:  No


Hx Spinal Cord Injury:  Yes - spinal stenosis


Hx Aphasia:  Yes


Hx Dysphasia:  Yes


Hx Weakness:  Yes - generalized muscle weakness


Hx Neurologic Surgery:  No


Hx Brain Shunt:  No





Review of Systems


All Other Systems:  limited





Physical Exam





Vital Signs








  Date Time  Temp Pulse Resp B/P (MAP) Pulse Ox O2 Delivery O2 Flow Rate FiO2


 


7/30/18 14:11 97.3 50 18 106/52 99 Nasal Cannula 2.0 





 97.3       








Sp02 EP Interpretation:  reviewed, normal


General Appearance:  no apparent distress, non-toxic, other - nonverbal


Head:  normocephalic


Eyes:  bilateral eye normal inspection, bilateral eye PERRL


ENT:  normal ENT inspection


Neck:  normal inspection


Respiratory:  normal inspection


Cardiovascular #1:  normal inspection


Gastrointestinal:  normal bowel sounds, non tender, soft, non-distended, no 

guarding, no rebound, other - Gtube site C/D/I. huynh catheter in place


Rectal:  deferred


Genitourinary:  no CVA tenderness


Musculoskeletal:  normal inspection


Neurologic:  other - nonverbal


Psychiatric:  other - nonverbal


Skin:  normal inspection


Lymphatic:  normal inspection





Procedures


Additional Procedure


Procedure Narrative


G-tube placement





Patient placed on stretcher. Old G-tube is removed by deflating the balloon 

using syringe.  G-tube site is inspected with no contraindications to G-tube 

placement.  G-tube slowly inserted until resistance is met; G-tube balloon is 

slowly filled with 20 mL of normal saline and slowly retracted back until 

resistance is met.  G-tube placement is confirmed with KUB study using 

Gastrografin





Medical Decision Making


Diagnostic Impression:  


 Primary Impression:  


 Malfunction of gastrostomy tube


ER Course


Hospital Course 


83-year-old male presents to ED for G-tube placement.





Clinical course


Patient placed on stretcher.  After initial history and physical I replaced G-

tube and inflate the balloon.  G-tube placement confirmed with KUB study.  

Patient remained stable without any signs of distress.  Nursing home called and 

patient subsequently discharged back to facility.





Dr Parker made aware that G-tube was successfully replaced and patient return 

to facility





Diagnosis - malfunction of G tube 





stable and discharged back to facility.  Followup with PMD.  Return to ED if 

symptoms recur or worsen


Other X-Ray Diagnostic Results


Other X-Ray Diagnostic Results :  


   X-Ray ordered:  KUB


   # of Views/Limited Vs Complete:  1 View


   Indication:  Other - Gtube placement


   EP Interpretation:  Yes


   Interpretation:  nonspecific bowel gas, no sbo, other - Gtube in place


   Impression:  Other - Gtube confirmed


   Electronically Signed by:  Electronically signed by Devin Armenta MD





Last Vital Signs








  Date Time  Temp Pulse Resp B/P (MAP) Pulse Ox O2 Delivery O2 Flow Rate FiO2


 


7/30/18 15:17 98.1 63 12 102/65 100 Nasal Cannula 2.0 





 97.3       








Status:  improved


Disposition:  [facility]


Condition:  Stable


Referrals:  


Lina Parker MD (PCP)


Patient Instructions:  Gastrostomy Tube Home Guide, Adult











Devin Armenta MD Jul 30, 2018 15:26

## 2018-09-21 ENCOUNTER — HOSPITAL ENCOUNTER (EMERGENCY)
Dept: HOSPITAL 72 - EMR | Age: 83
Discharge: NURSING, REHAB OR LONG-TERM CARE | End: 2018-09-21
Payer: MEDICARE

## 2018-09-21 VITALS — BODY MASS INDEX: 20.46 KG/M2 | WEIGHT: 135 LBS | HEIGHT: 68 IN

## 2018-09-21 VITALS — DIASTOLIC BLOOD PRESSURE: 69 MMHG | SYSTOLIC BLOOD PRESSURE: 123 MMHG

## 2018-09-21 VITALS — SYSTOLIC BLOOD PRESSURE: 123 MMHG | DIASTOLIC BLOOD PRESSURE: 69 MMHG

## 2018-09-21 VITALS — DIASTOLIC BLOOD PRESSURE: 61 MMHG | SYSTOLIC BLOOD PRESSURE: 107 MMHG

## 2018-09-21 DIAGNOSIS — F03.90: ICD-10-CM

## 2018-09-21 DIAGNOSIS — I10: ICD-10-CM

## 2018-09-21 DIAGNOSIS — Z86.61: ICD-10-CM

## 2018-09-21 DIAGNOSIS — Z86.73: ICD-10-CM

## 2018-09-21 DIAGNOSIS — K94.23: Primary | ICD-10-CM

## 2018-09-21 PROCEDURE — 99283 EMERGENCY DEPT VISIT LOW MDM: CPT

## 2018-09-21 PROCEDURE — 74018 RADEX ABDOMEN 1 VIEW: CPT

## 2018-09-21 NOTE — EMERGENCY ROOM REPORT
History of Present Illness


General


Chief Complaint:  General Complaint


Source:  Medical Record





Present Illness


HPI


Patient presents with request of change of feeding tube


The nursing facility reports that the feeding tube is malfunctioning leaking 

around the edges





Patient himself is nonverbal chronically debilitated





This does limit the history of present illness somewhat


There was no other reports of vomiting or diarrhea no reports of any fevers


Allergies:  


Coded Allergies:  


     NO KNOWN DRUG ALLERGIES (Unverified  Allergy, Unknown, 1/28/16)





Patient History


Limited by:  medical condition


Past Medical History:  see triage record


Pertinent Family History:  unable to obtain


Reviewed Nursing Documentation:  PMH: Agreed; PSxH: Agreed





Nursing Documentation-PMH


Hx Cardiac Problems:  Yes - anemia, angina,CAD


Hx Hypertension:  Yes


Hx Pacemaker:  No


Hx Asthma:  No


Hx COPD:  No


Hx Diabetes:  No


Hx Cancer:  No


Hx Gastrointestinal Problems:  Yes - Dysphagia, G-tube


Hx Dialysis:  No


Hx Neurological Problems:  Yes - encephalopathy


Hx Cerebrovascular Accident:  Yes


Hx Transient Ischemic Attacks:  Yes - no residual


Hx Dementia:  Yes


Hx Seizures:  No


Hx Spinal Cord Injury:  Yes - spinal stenosis


Hx Aphasia:  Yes


Hx Dysphasia:  Yes


Hx Weakness:  Yes - generalized muscle weakness


Hx Neurologic Surgery:  No


Hx Brain Shunt:  No





Review of Systems


All Other Systems:  limited - Other than the ones mentioned in the history of 

present illness all others are reviewed however they do stay limited due to the 

patient's mental status





Physical Exam





Vital Signs








  Date Time  Temp Pulse Resp B/P (MAP) Pulse Ox O2 Delivery O2 Flow Rate FiO2


 


9/21/18 10:50 97.0 70 16 120/70 98 Room Air  





 97.0       








Sp02 EP Interpretation:  reviewed, normal


General Appearance:  no apparent distress


Head:  normocephalic, atraumatic


Eyes:  bilateral eye PERRL


ENT:  normal pharynx, no angioedema


Neck:  supple


Respiratory:  lungs clear, normal breath sounds, no retraction


Cardiovascular #1:  regular rate, rhythm


Gastrointestinal:  non tender, soft, other - Feeding tube in place


Musculoskeletal:  other - Chronically debilitated does not follow commands has 

significant resistance in the upper extremities


Neurologic:  responsive - To verbal and physical stimuli


Skin:  no rash, warm/dry





Medical Decision Making


Diagnostic Impression:  


 Primary Impression:  


 Malfunction of gastrostomy tube


ER Course


Given the history and findings the feeding tube itself was able to be flushed 

however there is some leaking around the entrance aspect above the stoma


On further evaluation it appears that the feeding tube has been trimmed down 

significantly and there is only about 3-4 cm left on the feeding tube


Likely leading to the leaking around the site





The balloon was already deflated


And the feeding tube removed appears old





A new 16 [place] feeding tube was placed through the stoma without any 

resistance after cleansing and prepping the area


Patient are the procedure well area was taped down and KUB with Gastrografin is 

obtained for confirmation and patient stable for close outpatient nursing home 

follow-up


Other X-Ray Diagnostic Results


Other X-Ray Diagnostic Results :  


   X-Ray ordered:  kub


   # of Views/Limited Vs Complete:  1 View


   Indication:  Other


   EP Interpretation:  Yes


   Interpretation:  no dislocation, no soft tissue swelling, other - 

Gastrografin appropriately in the lumen, no obvious extravasation


   Impression:  Other - Appropriate tube placement


   Electronically Signed by:  Joey Connolly DO





Last Vital Signs








  Date Time  Temp Pulse Resp B/P (MAP) Pulse Ox O2 Delivery O2 Flow Rate FiO2


 


9/21/18 10:50 97.0 70 16 120/70 98 Room Air  





 97.0       








Status:  improved


Disposition:  XFER SNF


Condition:  Improved


Referrals:  


Lina Parker MD (PCP)


Patient Instructions:  Gastrostomy Tube Home Guide, Adult





Additional Instructions:  


follow-up with your primary primary care physician next 2-3days











Joey Connolly DO Sep 21, 2018 12:08

## 2018-09-21 NOTE — DIAGNOSTIC IMAGING REPORT
Indication: Gastrostomy check

 

Comparison:  None

 

Single view of the abdomen obtained 

 

Findings:

   

Contrast injected into the indwelling gastrostomy which is projected over the antrum

of the stomach. There is contrast material demonstrated within the stomach and

proximal duodenum. There is no contrast extravasation.

 

IMPRESSION:

 

Gastrostomy in good position. No leak

## 2018-11-27 ENCOUNTER — HOSPITAL ENCOUNTER (INPATIENT)
Dept: HOSPITAL 72 - EMR | Age: 83
LOS: 4 days | Discharge: NURSING, REHAB OR LONG-TERM CARE | DRG: 871 | End: 2018-12-01
Payer: MEDICARE

## 2018-11-27 VITALS — DIASTOLIC BLOOD PRESSURE: 59 MMHG | SYSTOLIC BLOOD PRESSURE: 135 MMHG

## 2018-11-27 VITALS — DIASTOLIC BLOOD PRESSURE: 60 MMHG | SYSTOLIC BLOOD PRESSURE: 154 MMHG

## 2018-11-27 VITALS — WEIGHT: 117.56 LBS | BODY MASS INDEX: 19.58 KG/M2 | HEIGHT: 65 IN

## 2018-11-27 VITALS — DIASTOLIC BLOOD PRESSURE: 97 MMHG | SYSTOLIC BLOOD PRESSURE: 115 MMHG

## 2018-11-27 DIAGNOSIS — Z16.12: ICD-10-CM

## 2018-11-27 DIAGNOSIS — L89.899: ICD-10-CM

## 2018-11-27 DIAGNOSIS — Z43.1: ICD-10-CM

## 2018-11-27 DIAGNOSIS — J15.5: ICD-10-CM

## 2018-11-27 DIAGNOSIS — I10: ICD-10-CM

## 2018-11-27 DIAGNOSIS — E43: ICD-10-CM

## 2018-11-27 DIAGNOSIS — E46: ICD-10-CM

## 2018-11-27 DIAGNOSIS — L89.159: ICD-10-CM

## 2018-11-27 DIAGNOSIS — A41.9: Primary | ICD-10-CM

## 2018-11-27 DIAGNOSIS — Z66: ICD-10-CM

## 2018-11-27 DIAGNOSIS — F03.90: ICD-10-CM

## 2018-11-27 DIAGNOSIS — N39.0: ICD-10-CM

## 2018-11-27 DIAGNOSIS — E87.1: ICD-10-CM

## 2018-11-27 DIAGNOSIS — L89.90: ICD-10-CM

## 2018-11-27 DIAGNOSIS — L89.109: ICD-10-CM

## 2018-11-27 DIAGNOSIS — D63.8: ICD-10-CM

## 2018-11-27 DIAGNOSIS — R13.10: ICD-10-CM

## 2018-11-27 DIAGNOSIS — B19.20: ICD-10-CM

## 2018-11-27 DIAGNOSIS — J69.0: ICD-10-CM

## 2018-11-27 DIAGNOSIS — L03.311: ICD-10-CM

## 2018-11-27 LAB
ADD MANUAL DIFF: NO
ALBUMIN SERPL-MCNC: 2.5 G/DL (ref 3.4–5)
ALBUMIN/GLOB SERPL: 0.5 {RATIO} (ref 1–2.7)
ALP SERPL-CCNC: 136 U/L (ref 46–116)
ALT SERPL-CCNC: 89 U/L (ref 12–78)
ANION GAP SERPL CALC-SCNC: 7 MMOL/L (ref 5–15)
APPEARANCE UR: CLEAR
APTT BLD: 32 SEC (ref 23–33)
APTT PPP: YELLOW S
AST SERPL-CCNC: 105 U/L (ref 15–37)
BASOPHILS NFR BLD AUTO: 0.6 % (ref 0–2)
BILIRUB DIRECT SERPL-MCNC: 0.6 MG/DL (ref 0–0.3)
BILIRUB SERPL-MCNC: 1.3 MG/DL (ref 0.2–1)
BUN SERPL-MCNC: 24 MG/DL (ref 7–18)
CALCIUM SERPL-MCNC: 8.8 MG/DL (ref 8.5–10.1)
CHLORIDE SERPL-SCNC: 95 MMOL/L (ref 98–107)
CK SERPL-CCNC: 88 U/L (ref 26–308)
CO2 SERPL-SCNC: 25 MMOL/L (ref 21–32)
CREAT SERPL-MCNC: 0.5 MG/DL (ref 0.55–1.3)
EOSINOPHIL NFR BLD AUTO: 0 % (ref 0–3)
ERYTHROCYTE [DISTWIDTH] IN BLOOD BY AUTOMATED COUNT: 10 % (ref 11.6–14.8)
GLOBULIN SER-MCNC: 5 G/DL
GLUCOSE UR STRIP-MCNC: NEGATIVE MG/DL
HCT VFR BLD CALC: 35.6 % (ref 42–52)
HGB BLD-MCNC: 12.3 G/DL (ref 14.2–18)
INR PPP: 1.1 (ref 0.9–1.1)
KETONES UR QL STRIP: NEGATIVE
LEUKOCYTE ESTERASE UR QL STRIP: (no result)
LYMPHOCYTES NFR BLD AUTO: 13.2 % (ref 20–45)
MCV RBC AUTO: 93 FL (ref 80–99)
MONOCYTES NFR BLD AUTO: 11.9 % (ref 1–10)
NEUTROPHILS NFR BLD AUTO: 74.3 % (ref 45–75)
NITRITE UR QL STRIP: NEGATIVE
PH UR STRIP: 5 [PH] (ref 4.5–8)
PLATELET # BLD: 181 K/UL (ref 150–450)
POTASSIUM SERPL-SCNC: 4.4 MMOL/L (ref 3.5–5.1)
PROT UR QL STRIP: (no result)
RBC # BLD AUTO: 3.83 M/UL (ref 4.7–6.1)
SODIUM SERPL-SCNC: 127 MMOL/L (ref 136–145)
SP GR UR STRIP: 1.02 (ref 1–1.03)
UROBILINOGEN UR-MCNC: 1 MG/DL (ref 0–1)
WBC # BLD AUTO: 9.8 K/UL (ref 4.8–10.8)

## 2018-11-27 PROCEDURE — 80053 COMPREHEN METABOLIC PANEL: CPT

## 2018-11-27 PROCEDURE — 87070 CULTURE OTHR SPECIMN AEROBIC: CPT

## 2018-11-27 PROCEDURE — 80061 LIPID PANEL: CPT

## 2018-11-27 PROCEDURE — 81003 URINALYSIS AUTO W/O SCOPE: CPT

## 2018-11-27 PROCEDURE — 96368 THER/DIAG CONCURRENT INF: CPT

## 2018-11-27 PROCEDURE — 85610 PROTHROMBIN TIME: CPT

## 2018-11-27 PROCEDURE — 86705 HEP B CORE ANTIBODY IGM: CPT

## 2018-11-27 PROCEDURE — 86140 C-REACTIVE PROTEIN: CPT

## 2018-11-27 PROCEDURE — 82746 ASSAY OF FOLIC ACID SERUM: CPT

## 2018-11-27 PROCEDURE — 87081 CULTURE SCREEN ONLY: CPT

## 2018-11-27 PROCEDURE — 99285 EMERGENCY DEPT VISIT HI MDM: CPT

## 2018-11-27 PROCEDURE — 84484 ASSAY OF TROPONIN QUANT: CPT

## 2018-11-27 PROCEDURE — 82977 ASSAY OF GGT: CPT

## 2018-11-27 PROCEDURE — 93005 ELECTROCARDIOGRAM TRACING: CPT

## 2018-11-27 PROCEDURE — 96365 THER/PROPH/DIAG IV INF INIT: CPT

## 2018-11-27 PROCEDURE — 85007 BL SMEAR W/DIFF WBC COUNT: CPT

## 2018-11-27 PROCEDURE — 87086 URINE CULTURE/COLONY COUNT: CPT

## 2018-11-27 PROCEDURE — 82607 VITAMIN B-12: CPT

## 2018-11-27 PROCEDURE — 83550 IRON BINDING TEST: CPT

## 2018-11-27 PROCEDURE — 86709 HEPATITIS A IGM ANTIBODY: CPT

## 2018-11-27 PROCEDURE — 83540 ASSAY OF IRON: CPT

## 2018-11-27 PROCEDURE — 87181 SC STD AGAR DILUTION PER AGT: CPT

## 2018-11-27 PROCEDURE — 83735 ASSAY OF MAGNESIUM: CPT

## 2018-11-27 PROCEDURE — 87205 SMEAR GRAM STAIN: CPT

## 2018-11-27 PROCEDURE — 83880 ASSAY OF NATRIURETIC PEPTIDE: CPT

## 2018-11-27 PROCEDURE — 82728 ASSAY OF FERRITIN: CPT

## 2018-11-27 PROCEDURE — 87340 HEPATITIS B SURFACE AG IA: CPT

## 2018-11-27 PROCEDURE — 84100 ASSAY OF PHOSPHORUS: CPT

## 2018-11-27 PROCEDURE — 84134 ASSAY OF PREALBUMIN: CPT

## 2018-11-27 PROCEDURE — 86803 HEPATITIS C AB TEST: CPT

## 2018-11-27 PROCEDURE — 71045 X-RAY EXAM CHEST 1 VIEW: CPT

## 2018-11-27 PROCEDURE — 86710 INFLUENZA VIRUS ANTIBODY: CPT

## 2018-11-27 PROCEDURE — 84443 ASSAY THYROID STIM HORMONE: CPT

## 2018-11-27 PROCEDURE — 84550 ASSAY OF BLOOD/URIC ACID: CPT

## 2018-11-27 PROCEDURE — 87040 BLOOD CULTURE FOR BACTERIA: CPT

## 2018-11-27 PROCEDURE — 85025 COMPLETE CBC W/AUTO DIFF WBC: CPT

## 2018-11-27 PROCEDURE — 82550 ASSAY OF CK (CPK): CPT

## 2018-11-27 PROCEDURE — 82248 BILIRUBIN DIRECT: CPT

## 2018-11-27 PROCEDURE — 85730 THROMBOPLASTIN TIME PARTIAL: CPT

## 2018-11-27 PROCEDURE — 96367 TX/PROPH/DG ADDL SEQ IV INF: CPT

## 2018-11-27 PROCEDURE — 83605 ASSAY OF LACTIC ACID: CPT

## 2018-11-27 PROCEDURE — 36415 COLL VENOUS BLD VENIPUNCTURE: CPT

## 2018-11-27 RX ADMIN — DEXTROSE MONOHYDRATE SCH MLS/HR: 50 INJECTION, SOLUTION INTRAVENOUS at 21:17

## 2018-11-27 RX ADMIN — PANTOPRAZOLE SODIUM SCH MG: 40 INJECTION, POWDER, FOR SOLUTION INTRAVENOUS at 21:17

## 2018-11-27 RX ADMIN — HEPARIN SODIUM SCH UNITS: 5000 INJECTION INTRAVENOUS; SUBCUTANEOUS at 21:18

## 2018-11-27 RX ADMIN — TRAMADOL HYDROCHLORIDE SCH MG: 50 TABLET, FILM COATED ORAL at 20:24

## 2018-11-27 NOTE — HISTORY & PHYSICAL
History and Physical


History & Physicial


pneumonia


Malnutrition


UTI


HypoNatremia


PEG


Anemia


Dementia


elevated LFTs


Previously on Hospice


DNR DNI





# [id number]











Morales Hill MD Nov 27, 2018 18:08

## 2018-11-27 NOTE — EMERGENCY ROOM REPORT
History of Present Illness


General


Chief Complaint:  Fever


Source:  EMS





Present Illness


HPI


Patient presents with fever.  DNR 





Chronically debilitated and unable to give history.





Admitted November 2017 with these d/c dx:


1. Likely aspiration pneumonia.


2. Coffee-ground vomitus.


3. Dysphagia with percutaneous endoscopic gastrostomy tube.


4. Urinary tract infection with Proteus.


5. Mild hematuria.


6. Dehydration with high sodium.


7. Malnutrition.


8. Dementia.


9. Coronary artery disease.


10. Hypertension.


11. Multiple pressure ulcer.  Refer to wound documentation.


Allergies:  


Coded Allergies:  


     NO KNOWN DRUG ALLERGIES (Unverified  Allergy, Unknown, 1/28/16)





Patient History


Limited by:  medical condition


Past Medical History:  see triage record, old chart reviewed


Past Surgical History:  other - g tube


Social History Narrative


SNF


Reviewed Nursing Documentation:  PMH: Agreed; PSxH: Agreed





Nursing Documentation-PMH


Past Medical History:  No History, Except For


Hx Cardiac Problems:  Yes - anemia, angina,CAD


Hx Hypertension:  Yes


Hx Pacemaker:  No


Hx Asthma:  No


Hx COPD:  No


Hx Diabetes:  No


Hx Cancer:  No


Hx Gastrointestinal Problems:  Yes - Dysphagia, G-tube


Hx Dialysis:  No


Hx Neurological Problems:  Yes - encephalopathy


Hx Cerebrovascular Accident:  Yes


Hx Transient Ischemic Attacks:  Yes - no residual


Hx Dementia:  Yes


Hx Seizures:  No


Hx Spinal Cord Injury:  Yes - spinal stenosis


Hx Aphasia:  Yes


Hx Dysphasia:  Yes


Hx Weakness:  Yes - generalized muscle weakness


Hx Neurologic Surgery:  No


Hx Brain Shunt:  No





Review of Systems


All Other Systems:  limited





Physical Exam





Vital Signs








  Date Time  Temp Pulse Resp B/P (MAP) Pulse Ox O2 Delivery O2 Flow Rate FiO2


 


11/27/18 13:27 97.9   128/48    


 


11/27/18 14:00  95 13   Nasal Cannula  93


 


11/27/18 14:00     93   








Sp02 EP Interpretation:  reviewed, abnormal - interpreted as low by me


General Appearance:  other - fetal position with contractures - minimal 

response to pain, Chronically Ill


Eyes:  bilateral eye normal inspection, bilateral eye PERRL


ENT:  moist mucus membranes


Neck:  no bony tend, other - rigidity


Respiratory:  crackles, rhonchi


Cardiovascular #1:  regular rate, rhythm


Cardiovascular #2:  2+ radial (R)


Gastrointestinal:  non tender, soft, other - G tube, decreased bowel sounds


Genitourinary:  no CVA tenderness


Musculoskeletal:  other - contractures


Neurologic:  other - minimally responsive but + ag


Psychiatric:  other - stupor


Skin:  other - decubiti, head, L leg, feet





Medical Decision Making


Diagnostic Impression:  


 Primary Impression:  


 Sepsis


 Qualified Codes:  A41.9 - Sepsis, unspecified organism


 Additional Impressions:  


 Pneumonia


 Qualified Codes:  J18.1 - Lobar pneumonia, unspecified organism


 Elevated liver enzymes


 Hyponatremia


ER Course


Patient with fever.  DDX: sepsis, PNA, UTI amongst others.  Evaluation with EKG

, CXR and labs including blood cultures.  Treatment with aggressive IV hydration

, tylenol, antibiotics.  Placed on cardiac monitor and given oxygen.





EKG without injury.  CXR with LLL infiltrate and possible effusion.  WBC 

normal.  CMP with low sodium and elevated BUN and elevated LFTs.  UA clear.  





Has had chronic elevation of LFTs in past.





Sepsis re-evaluation 15:20: more alert, good cap fill, .





Admitted telemetry Dr. Hill.





Laboratory Tests








Test


  11/27/18


13:30 11/27/18


13:45 11/27/18


14:20 11/27/18


19:05


 


Sodium Level


  127 MMOL/L


(136-145)  L 


  


  


 


 


Potassium Level


  4.4 MMOL/L


(3.5-5.1) 


  


  


 


 


Chloride Level


  95 MMOL/L


()  L 


  


  


 


 


Carbon Dioxide Level


  25 MMOL/L


(21-32) 


  


  


 


 


Anion Gap


  7 mmol/L


(5-15) 


  


  


 


 


Blood Urea Nitrogen


  24 mg/dL


(7-18)  H 


  


  


 


 


Creatinine


  0.5 MG/DL


(0.55-1.30)  L 


  


  


 


 


Estimate Glomerular


Filtration Rate  mL/min (>60)  


  


  


  


 


 


Glucose Level


  109 MG/DL


()  H 


  


  


 


 


Calcium Level


  8.8 MG/DL


(8.5-10.1) 


  


  


 


 


Magnesium Level


  1.8 MG/DL


(1.8-2.4) 


  


  


 


 


Total Bilirubin


  1.3 MG/DL


(0.2-1.0)  H 


  


  


 


 


Direct Bilirubin


  0.6 MG/DL


(0.0-0.3)  H 


  


  


 


 


Aspartate Amino Transferase


(AST) 105 U/L


(15-37)  H 


  


  


 


 


Alanine Aminotransferase (ALT)


  89 U/L (12-78)


H 


  


  


 


 


Alkaline Phosphatase


  136 U/L


()  H 


  


  


 


 


Total Creatine Kinase


  88 U/L


() 


  


  


 


 


Pro-B-Type Natriuretic Peptide


  1247 pg/mL


(0-125)  H 


  


  


 


 


Total Protein


  7.5 G/DL


(6.4-8.2) 


  


  


 


 


Albumin


  2.5 G/DL


(3.4-5.0)  L 


  


  


 


 


Globulin 5.0 g/dL     


 


Albumin/Globulin Ratio


  0.5 (1.0-2.7)


L 


  


  


 


 


White Blood Count


  


  9.8 K/UL


(4.8-10.8) 


  


 


 


Red Blood Count


  


  3.83 M/UL


(4.70-6.10)  L 


  


 


 


Hemoglobin


  


  12.3 G/DL


(14.2-18.0)  L 


  


 


 


Hematocrit


  


  35.6 %


(42.0-52.0)  L 


  


 


 


Mean Corpuscular Volume  93 FL (80-99)    


 


Mean Corpuscular Hemoglobin


  


  32.2 PG


(27.0-31.0)  H 


  


 


 


Mean Corpuscular Hemoglobin


Concent 


  34.6 G/DL


(32.0-36.0) 


  


 


 


Red Cell Distribution Width


  


  10.0 %


(11.6-14.8)  L 


  


 


 


Platelet Count


  


  181 K/UL


(150-450) 


  


 


 


Mean Platelet Volume


  


  7.5 FL


(6.5-10.1) 


  


 


 


Neutrophils (%) (Auto)


  


  74.3 %


(45.0-75.0) 


  


 


 


Lymphocytes (%) (Auto)


  


  13.2 %


(20.0-45.0)  L 


  


 


 


Monocytes (%) (Auto)


  


  11.9 %


(1.0-10.0)  H 


  


 


 


Eosinophils (%) (Auto)


  


  0.0 %


(0.0-3.0) 


  


 


 


Basophils (%) (Auto)


  


  0.6 %


(0.0-2.0) 


  


 


 


Prothrombin Time


  


  11.5 SEC


(9.30-11.50) 


  


 


 


Prothrombin Time INR  1.1 (0.9-1.1)    


 


PTT


  


  32 SEC (23-33)


  


  


 


 


Lactic Acid Level


  


  1.40 mmol/L


(0.4-2.0) 


  


 


 


Troponin I


  


  0.011 ng/mL


(0.000-0.056) 


  


 


 


C-Reactive Protein,


Quantitative 


  4.8 mg/dL


(0.00-0.90)  H 


  


 


 


Urine Color   Yellow   


 


Urine Appearance   Clear   


 


Urine pH   5 (4.5-8.0)   


 


Urine Specific Gravity


  


  


  1.020


(1.005-1.035) 


 


 


Urine Protein


  


  


  1+ (NEGATIVE)


H 


 


 


Urine Glucose (UA)


  


  


  Negative


(NEGATIVE) 


 


 


Urine Ketones


  


  


  Negative


(NEGATIVE) 


 


 


Urine Blood


  


  


  2+ (NEGATIVE)


H 


 


 


Urine Nitrite


  


  


  Negative


(NEGATIVE) 


 


 


Urine Bilirubin


  


  


  Negative


(NEGATIVE) 


 


 


Urine Urobilinogen


  


  


  1 MG/DL


(0.0-1.0)  H 


 


 


Urine Leukocyte Esterase


  


  


  1+ (NEGATIVE)


H 


 


 


Urine RBC


  


  


  2-4 /HPF (0 -


0)  H 


 


 


Urine WBC


  


  


  0-2 /HPF (0 -


0) 


 


 


Urine Squamous Epithelial


Cells 


  


  Occasional


/LPF 


 


 


Urine Amorphous Sediment


  


  


  Few /LPF


(NONE)  H 


 


 


Urine Bacteria


  


  


  Occasional


/HPF (NONE) 


 


 


Urine Mucus


  


  


  Few /LPF


(NONE/OCC)  H 


 


 


Hepatitis A IgM Antibody    Pending  


 


Hepatitis B Surface Antigen    Pending  


 


Hepatitis B Core IgM Antibody    Pending  


 


Hepatitis C Antibody    Pending  


 


Test


  11/28/18


05:55 


  


  


 


 


White Blood Count


  11.3 K/UL


(4.8-10.8)  H 


  


  


 


 


Red Blood Count


  2.98 M/UL


(4.70-6.10)  L 


  


  


 


 


Hemoglobin


  9.6 G/DL


(14.2-18.0)  L 


  


  


 


 


Hematocrit


  27.8 %


(42.0-52.0)  L 


  


  


 


 


Mean Corpuscular Volume 94 FL (80-99)     


 


Mean Corpuscular Hemoglobin


  32.4 PG


(27.0-31.0)  H 


  


  


 


 


Mean Corpuscular Hemoglobin


Concent 34.7 G/DL


(32.0-36.0) 


  


  


 


 


Red Cell Distribution Width


  10.3 %


(11.6-14.8)  L 


  


  


 


 


Platelet Count


  139 K/UL


(150-450)  L 


  


  


 


 


Mean Platelet Volume


  6.8 FL


(6.5-10.1) 


  


  


 


 


Neutrophils (%) (Auto)


  79.4 %


(45.0-75.0)  H 


  


  


 


 


Lymphocytes (%) (Auto)


  11.6 %


(20.0-45.0)  L 


  


  


 


 


Monocytes (%) (Auto)


  8.3 %


(1.0-10.0) 


  


  


 


 


Eosinophils (%) (Auto)


  0.0 %


(0.0-3.0) 


  


  


 


 


Basophils (%) (Auto)


  0.7 %


(0.0-2.0) 


  


  


 


 


Sodium Level


  130 MMOL/L


(136-145)  L 


  


  


 


 


Potassium Level


  4.3 MMOL/L


(3.5-5.1) 


  


  


 


 


Chloride Level


  100 MMOL/L


() 


  


  


 


 


Carbon Dioxide Level


  25 MMOL/L


(21-32) 


  


  


 


 


Anion Gap


  5 mmol/L


(5-15) 


  


  


 


 


Blood Urea Nitrogen


  24 mg/dL


(7-18)  H 


  


  


 


 


Creatinine


  0.6 MG/DL


(0.55-1.30) 


  


  


 


 


Estimate Glomerular


Filtration Rate  mL/min (>60)  


  


  


  


 


 


Glucose Level


  136 MG/DL


()  H 


  


  


 


 


Uric Acid


  2.0 MG/DL


(2.6-7.2)  L 


  


  


 


 


Calcium Level


  8.1 MG/DL


(8.5-10.1)  L 


  


  


 


 


Phosphorus Level


  2.1 MG/DL


(2.5-4.9)  L 


  


  


 


 


Magnesium Level


  1.6 MG/DL


(1.8-2.4)  L 


  


  


 


 


Iron Level


  30 ug/dL


()  L 


  


  


 


 


Total Iron Binding Capacity


  203 ug/dL


(250-450)  L 


  


  


 


 


Percent Iron Saturation 15 % (15-50)     


 


Unsaturated Iron Binding


  173 ug/dL


(112-346) 


  


  


 


 


Ferritin


  372 NG/ML


(8-388) 


  


  


 


 


Total Bilirubin


  1.2 MG/DL


(0.2-1.0)  H 


  


  


 


 


Direct Bilirubin


  0.7 MG/DL


(0.0-0.3)  H 


  


  


 


 


Gamma Glutamyl Transpeptidase 56 U/L (5-85)     


 


Aspartate Amino Transferase


(AST) 70 U/L (15-37)


H 


  


  


 


 


Alanine Aminotransferase (ALT)


  66 U/L (12-78)


  


  


  


 


 


Alkaline Phosphatase


  91 U/L


() 


  


  


 


 


Troponin I


  0.036 ng/mL


(0.000-0.056) 


  


  


 


 


Pro-B-Type Natriuretic Peptide


  4079 pg/mL


(0-125)  H 


  


  


 


 


Total Protein


  6.2 G/DL


(6.4-8.2)  L 


  


  


 


 


Albumin


  2.0 G/DL


(3.4-5.0)  L 


  


  


 


 


Globulin 4.2 g/dL     


 


Albumin/Globulin Ratio


  0.5 (1.0-2.7)


L 


  


  


 


 


Triglycerides Level


  25 MG/DL


()  L 


  


  


 


 


Cholesterol Level


  62 MG/DL (<


200) 


  


  


 


 


LDL Cholesterol


  40 mg/dL


(<100) 


  


  


 


 


HDL Cholesterol


  34 MG/DL


(40-60)  L 


  


  


 


 


Cholesterol/HDL Ratio


  1.8 (3.3-4.4)


L 


  


  


 


 


Vitamin B12 Level


  455 PG/ML


(193-986) 


  


  


 


 


Folate


  22.9 NG/ML


(8.6-58.9) 


  


  


 


 


Thyroid Stimulating Hormone


(TSH) 5.674 uiU/mL


(0.358-3.740) 


  


  


 








Microbiology








 Date/Time


Source Procedure


Growth Status


 


 


 11/27/18 13:45


Nasal Nares Influenza Types A,B Antigen (JEANNETTE) - Final Complete








EKG Diagnostic Results


Rate:  normal


Rhythm:  NSR


ST Segments:  other - NSSTTW changes





Rhythm Strip Diag. Results


EP Interpretation:  yes


Rhythm:  NSR, no PVC's, no ectopy





Chest X-Ray Diagnostic Results


Chest X-Ray Diagnostic Results :  


   Chest X-Ray Ordered:  Yes


   # of Views/Limited/Complete:  1 View


   Indication:  Other


   EP Interpretation:  Yes


   Interpretation:  no pneumothorax, other - LLL infiltrate and inc cor


   Impression:  Other


   Electronically Signed by:  Electronically signed by Ankit Marquez MD


Status:  improved


Disposition:  ADMITTED AS INPATIENT


Condition:  Serious


Referrals:  


Lina Parker MD (PCP)











Ankit Marquez MD Nov 27, 2018 15:25

## 2018-11-27 NOTE — HISTORY AND PHYSICAL REPORT
DATE OF ADMISSION:  11/27/2018

HISTORY OF PRESENT ILLNESS:  The patient is an 84-year-old, a resident of

[facility] extended care facility, recently taken off hospice.

The patient had over 105 fever and lethargy.  The patient was transferred

to the emergency room for evaluation.  After initial evaluation, he is

being admitted for further management.



PAST MEDICAL HISTORY:  Dementia, hypothyroidism, previous UTI, GT tube,

malnutrition, anemia, and hypertension.



MEDICATIONS:  According to the list.



PHYSICAL EXAMINATION:

GENERAL:  The patient is nonverbal and lethargic.

VITAL SIGNS:  T-max of 100.9, tachycardic 101, blood pressure 128/48, and

respiratory rate is variable between 13 to 22.

HEENT:  The patient is in fetal position.  Somewhat contracted upper and

lower extremities.  Emaciated.  Bitemporal wasting.  Pale.  Keeps the

mouth _____ closed.

NECK:  Rigid to all direction.

LUNGS:  Poor inspiratory effort.  Decreased breath sounds over the bases,

mainly over the left.

HEART:  Tachycardic.  Occasionally irregular beats.

ABDOMEN:  Soft.  GT tube in place.

EXTREMITIES:  Lower extremities somewhat contracted.



LABORATORY AND DIAGNOSTIC DATA:  Laboratory results, hemoglobin 12.3 and

WBCs 9.8.  Sodium 127 and BUN 24.  AST and ALT slightly elevated.  Albumin

2.5.  Urine, 1+ protein, 4 RBCs, 2 WBCs, and 1+ leukocyte esterase.  Chest

x-ray, infiltrate over the left perihilar region of the left lung.



IMPRESSION:  This 84-year-old male presented with pneumonia, fever, and

urinary tract infection.  Other conditions, malnutrition, hyponatremia,

PEG, anemia, dementia, and elevated liver function tests.  The patient

previously on hospice.  Currently, DNR/DNI.



PLAN:  The patient will be started on D5 normal saline.  GT feeding.

Zosyn.  Blood and urine cultures are pending.  IV Protonix will be given.

Tylenol for pain and also for fever and Reglan p.r.n. for nausea or

vomiting.  Pulmonary and ID consult is requested.  According to how the

patient's condition evolves, we will make the proper changes in our future

management.









  ______________________________________________

  Morales Hill M.D.





DR:  CAYDEN

D:  11/27/2018 18:16

T:  11/27/2018 19:16

JOB#:  [id number]

CC:

## 2018-11-27 NOTE — INFECTIOUS DISEASES PROG NOTE
Assessment/Plan


Problems:  


(1) Aspiration pneumonia


Assessment & Plan:  with left perihilar infiltrates , will send sputum culture 

and start vancomycin with zosyn empirically , recommend aspiration precaution 

and keep HOB > 30 degree all the time 





(2) Fever with chills


Assessment & Plan:  due to the above, continue tylenol with wide spectrum 

antibiotics 





(3) Sepsis


Assessment & Plan:  due to the above, continue wide spectrum antibiotics 

pending blood culture 





(4) Elevated liver enzymes


Assessment & Plan:  suspect liver shock due to sepsis , will order hepatitis 

panel, close monitor of LFT, and US of the liver to rule out gall stone 





(5) Abdominal wall cellulitis


Assessment & Plan:  he will be already on wide spectrum antibiotics , continue 

local wound care .








Subjective


Allergies:  


Coded Allergies:  


     NO KNOWN DRUG ALLERGIES (Unverified  Allergy, Unknown, 1/28/16)





Objective


Vital Signs





Last 24 Hour Vital Signs








  Date Time  Temp Pulse Resp B/P (MAP) Pulse Ox O2 Delivery O2 Flow Rate FiO2


 


11/27/18 17:56      Nasal Cannula 3.0 


 


11/27/18 17:52 99.8 101 22 114/95 96 Nasal Cannula 5.0 


 


11/27/18 14:46 99.9       


 


11/27/18 14:00 100.9 95 13 115/97 93 Nasal Cannula  


 


11/27/18 14:00  95 13   Nasal Cannula  93


 


11/27/18 13:27 97.9   128/48    








Height (Feet):  5


Height (Inches):  5.00


Weight (Pounds):  110





Microbiology








 Date/Time


Source Procedure


Growth Status


 


 


 11/27/18 13:45


Nasal Nares Influenza Types A,B Antigen (JEANNETTE) - Final Complete











Laboratory Tests








Test


  11/27/18


13:30 11/27/18


13:45 11/27/18


14:20


 


Sodium Level


  127 MMOL/L


(136-145)  L 


  


 


 


Potassium Level


  4.4 MMOL/L


(3.5-5.1) 


  


 


 


Chloride Level


  95 MMOL/L


()  L 


  


 


 


Carbon Dioxide Level


  25 MMOL/L


(21-32) 


  


 


 


Anion Gap


  7 mmol/L


(5-15) 


  


 


 


Blood Urea Nitrogen


  24 mg/dL


(7-18)  H 


  


 


 


Creatinine


  0.5 MG/DL


(0.55-1.30)  L 


  


 


 


Estimat Glomerular Filtration


Rate  mL/min (>60)  


  


  


 


 


Glucose Level


  109 MG/DL


()  H 


  


 


 


Calcium Level


  8.8 MG/DL


(8.5-10.1) 


  


 


 


Magnesium Level


  1.8 MG/DL


(1.8-2.4) 


  


 


 


Total Bilirubin


  1.3 MG/DL


(0.2-1.0)  H 


  


 


 


Direct Bilirubin


  0.6 MG/DL


(0.0-0.3)  H 


  


 


 


Aspartate Amino Transf


(AST/SGOT) 105 U/L


(15-37)  H 


  


 


 


Alanine Aminotransferase


(ALT/SGPT) 89 U/L (12-78)


H 


  


 


 


Alkaline Phosphatase


  136 U/L


()  H 


  


 


 


Total Creatine Kinase


  88 U/L


() 


  


 


 


Pro-B-Type Natriuretic Peptide


  1247 pg/mL


(0-125)  H 


  


 


 


Total Protein


  7.5 G/DL


(6.4-8.2) 


  


 


 


Albumin


  2.5 G/DL


(3.4-5.0)  L 


  


 


 


Globulin 5.0 g/dL    


 


Albumin/Globulin Ratio


  0.5 (1.0-2.7)


L 


  


 


 


White Blood Count


  


  9.8 K/UL


(4.8-10.8) 


 


 


Red Blood Count


  


  3.83 M/UL


(4.70-6.10)  L 


 


 


Hemoglobin


  


  12.3 G/DL


(14.2-18.0)  L 


 


 


Hematocrit


  


  35.6 %


(42.0-52.0)  L 


 


 


Mean Corpuscular Volume  93 FL (80-99)   


 


Mean Corpuscular Hemoglobin


  


  32.2 PG


(27.0-31.0)  H 


 


 


Mean Corpuscular Hemoglobin


Concent 


  34.6 G/DL


(32.0-36.0) 


 


 


Red Cell Distribution Width


  


  10.0 %


(11.6-14.8)  L 


 


 


Platelet Count


  


  181 K/UL


(150-450) 


 


 


Mean Platelet Volume


  


  7.5 FL


(6.5-10.1) 


 


 


Neutrophils (%) (Auto)


  


  74.3 %


(45.0-75.0) 


 


 


Lymphocytes (%) (Auto)


  


  13.2 %


(20.0-45.0)  L 


 


 


Monocytes (%) (Auto)


  


  11.9 %


(1.0-10.0)  H 


 


 


Eosinophils (%) (Auto)


  


  0.0 %


(0.0-3.0) 


 


 


Basophils (%) (Auto)


  


  0.6 %


(0.0-2.0) 


 


 


Prothrombin Time


  


  11.5 SEC


(9.30-11.50) 


 


 


Prothromb Time International


Ratio 


  1.1 (0.9-1.1)  


  


 


 


Activated Partial


Thromboplast Time 


  32 SEC (23-33)


  


 


 


Lactic Acid Level


  


  1.40 mmol/L


(0.4-2.0) 


 


 


Troponin I


  


  0.011 ng/mL


(0.000-0.056) 


 


 


C-Reactive Protein,


Quantitative 


  4.8 mg/dL


(0.00-0.90)  H 


 


 


Urine Color   Yellow  


 


Urine Appearance   Clear  


 


Urine pH   5 (4.5-8.0)  


 


Urine Specific Gravity


  


  


  1.020


(1.005-1.035)


 


Urine Protein


  


  


  1+ (NEGATIVE)


H


 


Urine Glucose (UA)


  


  


  Negative


(NEGATIVE)


 


Urine Ketones


  


  


  Negative


(NEGATIVE)


 


Urine Blood


  


  


  2+ (NEGATIVE)


H


 


Urine Nitrite


  


  


  Negative


(NEGATIVE)


 


Urine Bilirubin


  


  


  Negative


(NEGATIVE)


 


Urine Urobilinogen


  


  


  1 MG/DL


(0.0-1.0)  H


 


Urine Leukocyte Esterase


  


  


  1+ (NEGATIVE)


H


 


Urine RBC


  


  


  2-4 /HPF (0 -


0)  H


 


Urine WBC


  


  


  0-2 /HPF (0 -


0)


 


Urine Squamous Epithelial


Cells 


  


  Occasional


/LPF


 


Urine Amorphous Sediment


  


  


  Few /LPF


(NONE)  H


 


Urine Bacteria


  


  


  Occasional


/HPF (NONE)


 


Urine Mucus


  


  


  Few /LPF


(NONE/OCC)  H











Current Medications








 Medications


  (Trade)  Dose


 Ordered  Sig/Bentia


 Route


 PRN Reason  Start Time


 Stop Time Status Last Admin


Dose Admin


 


 Acetaminophen


  (Tylenol)  650 mg  Q4H  PRN


 GT


 Mild Pain/Temp > 100.5  11/27/18 18:15


 12/27/18 18:14   


 


 


 Cefepime HCl 1 gm/


 Sodium Chloride  55 ml @ 


 110 mls/hr  Q12H


 IV


   11/27/18 14:00


 11/27/18 19:00  11/27/18 14:46


 


 


 Dextrose/Sodium


 Chloride  1,000 ml @ 


 50 mls/hr  Q20H


 IV


   11/27/18 18:15


 12/27/18 18:14   


 


 


 Heparin Sodium


  (Porcine)


  (Heparin 5000


 units/ml)  5,000 units  EVERY 12  HOURS


 SUBQ


   11/27/18 21:00


 12/27/18 20:59   


 


 


 Metoclopramide HCl


  (Reglan)  10 mg  Q6H  PRN


 IVP


 Nausea & Vomiting  11/27/18 18:15


 12/27/18 18:14   


 


 


 Pantoprazole


  (Protonix)  40 mg  EVERY 12  HOURS


 IVP


   11/27/18 21:00


 12/27/18 20:59   


 


 


 Piperacillin Sod/


 Tazobactam Sod


 3.375 gm/Dextrose  110 ml @ 


 27.5 mls/hr  EVERY 8  HOURS


 IVPB


   11/27/18 22:00


 12/2/18 21:59   


 


 


 Tramadol HCl


  (Ultram)  25 mg  Q8H


 GT


   11/27/18 18:30


 12/4/18 18:29   


 

















Ricky Espinal M.D. Nov 27, 2018 18:42

## 2018-11-27 NOTE — DIAGNOSTIC IMAGING REPORT
Indication: Shortness of breath

 

Technique: One view of the chest

 

Comparison: 11/13/2017

 

Findings: Infiltrates are seen in the left perihilar region and left lung base,

appearing less extensive than on the prior study. The left hemidiaphragm is elevated,

more so than on the previous study. The right lung and bilateral pleural spaces

remain clear. Heart size is upper limits of normal

 

Impression: Left perihilar infiltrates, as described. Uncertain as to whether this is

residual from the previous exam or represent new recurrent infiltrates. Correlate

with clinical findings

## 2018-11-28 VITALS — DIASTOLIC BLOOD PRESSURE: 46 MMHG | SYSTOLIC BLOOD PRESSURE: 104 MMHG

## 2018-11-28 VITALS — SYSTOLIC BLOOD PRESSURE: 100 MMHG | DIASTOLIC BLOOD PRESSURE: 53 MMHG

## 2018-11-28 VITALS — SYSTOLIC BLOOD PRESSURE: 126 MMHG | DIASTOLIC BLOOD PRESSURE: 44 MMHG

## 2018-11-28 VITALS — DIASTOLIC BLOOD PRESSURE: 53 MMHG | SYSTOLIC BLOOD PRESSURE: 108 MMHG

## 2018-11-28 VITALS — DIASTOLIC BLOOD PRESSURE: 64 MMHG | SYSTOLIC BLOOD PRESSURE: 115 MMHG

## 2018-11-28 VITALS — DIASTOLIC BLOOD PRESSURE: 68 MMHG | SYSTOLIC BLOOD PRESSURE: 128 MMHG

## 2018-11-28 LAB
% IRON SATURATION: 15 % (ref 15–50)
ADD MANUAL DIFF: NO
ALBUMIN SERPL-MCNC: 2 G/DL (ref 3.4–5)
ALBUMIN/GLOB SERPL: 0.5 {RATIO} (ref 1–2.7)
ALP SERPL-CCNC: 91 U/L (ref 46–116)
ALT SERPL-CCNC: 66 U/L (ref 12–78)
ANION GAP SERPL CALC-SCNC: 5 MMOL/L (ref 5–15)
AST SERPL-CCNC: 70 U/L (ref 15–37)
BASOPHILS NFR BLD AUTO: 0.7 % (ref 0–2)
BILIRUB DIRECT SERPL-MCNC: 0.7 MG/DL (ref 0–0.3)
BILIRUB SERPL-MCNC: 1.2 MG/DL (ref 0.2–1)
BUN SERPL-MCNC: 24 MG/DL (ref 7–18)
CALCIUM SERPL-MCNC: 8.1 MG/DL (ref 8.5–10.1)
CHLORIDE SERPL-SCNC: 100 MMOL/L (ref 98–107)
CHOLEST SERPL-MCNC: 62 MG/DL (ref ?–200)
CO2 SERPL-SCNC: 25 MMOL/L (ref 21–32)
CREAT SERPL-MCNC: 0.6 MG/DL (ref 0.55–1.3)
EOSINOPHIL NFR BLD AUTO: 0 % (ref 0–3)
ERYTHROCYTE [DISTWIDTH] IN BLOOD BY AUTOMATED COUNT: 10.3 % (ref 11.6–14.8)
FERRITIN SERPL-MCNC: 372 NG/ML (ref 8–388)
GAMMA GLUTAMYL TRANSPEPTIDASE: 56 U/L (ref 5–85)
GLOBULIN SER-MCNC: 4.2 G/DL
HCT VFR BLD CALC: 27.8 % (ref 42–52)
HDLC SERPL-MCNC: 34 MG/DL (ref 40–60)
HGB BLD-MCNC: 9.6 G/DL (ref 14.2–18)
IRON SERPL-MCNC: 30 UG/DL (ref 50–175)
LYMPHOCYTES NFR BLD AUTO: 11.6 % (ref 20–45)
MCV RBC AUTO: 94 FL (ref 80–99)
MONOCYTES NFR BLD AUTO: 8.3 % (ref 1–10)
NEUTROPHILS NFR BLD AUTO: 79.4 % (ref 45–75)
PHOSPHATE SERPL-MCNC: 2.1 MG/DL (ref 2.5–4.9)
PLATELET # BLD: 139 K/UL (ref 150–450)
POTASSIUM SERPL-SCNC: 4.3 MMOL/L (ref 3.5–5.1)
RBC # BLD AUTO: 2.98 M/UL (ref 4.7–6.1)
SODIUM SERPL-SCNC: 130 MMOL/L (ref 136–145)
TIBC SERPL-MCNC: 203 UG/DL (ref 250–450)
TRIGL SERPL-MCNC: 25 MG/DL (ref 30–150)
UNSATURATED IRON BINDING: 173 UG/DL (ref 112–346)
WBC # BLD AUTO: 11.3 K/UL (ref 4.8–10.8)

## 2018-11-28 RX ADMIN — PANTOPRAZOLE SODIUM SCH MG: 40 INJECTION, POWDER, FOR SOLUTION INTRAVENOUS at 20:00

## 2018-11-28 RX ADMIN — HEPARIN SODIUM SCH UNITS: 5000 INJECTION INTRAVENOUS; SUBCUTANEOUS at 19:54

## 2018-11-28 RX ADMIN — TRAMADOL HYDROCHLORIDE SCH MG: 50 TABLET, FILM COATED ORAL at 02:21

## 2018-11-28 RX ADMIN — PANTOPRAZOLE SODIUM SCH MG: 40 INJECTION, POWDER, FOR SOLUTION INTRAVENOUS at 09:35

## 2018-11-28 RX ADMIN — DEXTROSE MONOHYDRATE SCH MLS/HR: 50 INJECTION, SOLUTION INTRAVENOUS at 14:49

## 2018-11-28 RX ADMIN — DEXTROSE MONOHYDRATE SCH MLS/HR: 50 INJECTION, SOLUTION INTRAVENOUS at 05:20

## 2018-11-28 RX ADMIN — DEXTROSE MONOHYDRATE SCH MLS/HR: 50 INJECTION, SOLUTION INTRAVENOUS at 21:36

## 2018-11-28 RX ADMIN — TRAMADOL HYDROCHLORIDE SCH MG: 50 TABLET, FILM COATED ORAL at 11:21

## 2018-11-28 RX ADMIN — TRAMADOL HYDROCHLORIDE SCH MG: 50 TABLET, FILM COATED ORAL at 20:01

## 2018-11-28 RX ADMIN — HEPARIN SODIUM SCH UNITS: 5000 INJECTION INTRAVENOUS; SUBCUTANEOUS at 09:00

## 2018-11-28 NOTE — CONSULTATION
History of Present Illness


General


Date patient seen:  Nov 28, 2018


Chief Complaint:  Fever





Present Illness


HPI


84 year old male with end stage dementia, Bed bound, cachectic with Gtube,  

nursing home resident presented from nursing facility with reports of fevers


Pt used to be on hospice but he got just discharged because he was stable over.

   Pt looks chronically ill, and can't give any history.


Allergies:  


Coded Allergies:  


     NO KNOWN DRUG ALLERGIES (Unverified  Allergy, Unknown, 1/28/16)





Medication History


Scheduled


Ranitidine Hcl* (Zantac*), 150 MG ORAL TWICE A DAY





Scheduled PRN


Acetaminophen 160MG/5ML* (Acetaminophen*), 20 ML ORAL Q6HR PRN for For Pain, (

Reported)


Morphine Sulfate (Morphine Sulfate), 5 MG SL Q2HR PRN for Severe Pain (Pain 

Scale 7-10), (Reported)


Morphine Sulfate (Morphine Sulfate), 5 MG SL Q2HR PRN for Shortness of Breath, (

Reported)





Discontinued Medications


Amoxicillin/Potassium Clav 500-125 Tablet* (Augmentin 500-125 Tablet*), 1 TAB 

ORAL THREE TIMES A DAY


   Discontinued Reason: Therapy completed


Bacitracin (Bacitracin*), 1 PACKET TOPIC, (Reported)


   Discontinued Reason: Therapy completed





Patient History


Healthcare decision maker


N


Resuscitation status


Do Not Resuscitate


Advanced Directive on File


Yes





Past Medical/Surgical History


Past Medical/Surgical History:  


(1) Dementia


(2) CAD (coronary artery disease)


(3) S/P percutaneous endoscopic gastrostomy (PEG) tube placement





Review of Systems


All Other Systems:  negative except mentioned in HPI





Physical Exam


General Appearance:  cachetic


Lines, tubes and drains:  peripheral


HEENT:  normocephalic, atraumatic


Neck:  non-tender, normal alignment


Respiratory/Chest:  chest wall non-tender, lungs clear


Cardiovascular/Chest:  normal peripheral pulses, normal rate


Abdomen:  normal bowel sounds


Genitourinary/Rectal:  normal genital exam


Extremities:  normal range of motion


Skin Exam:  normal pigmentation





Last 24 Hour Vital Signs








  Date Time  Temp Pulse Resp B/P (MAP) Pulse Ox O2 Delivery O2 Flow Rate FiO2


 


11/28/18 08:00 98.1 92 19 126/44 (71) 98   


 


11/28/18 04:00 98.7 83 18 128/68 (88) 95   


 


11/28/18 04:00  81      


 


11/28/18 00:00 99.9 86 22 115/64 (81) 96   


 


11/28/18 00:00  91      


 


11/27/18 21:00      Nasal Cannula 3.0 


 


11/27/18 20:00  95      


 


11/27/18 20:00 97.4 101 25 154/60 (91) 96   


 


11/27/18 17:56      Nasal Cannula 3.0 


 


11/27/18 17:52 99.8 101 22 114/95 96 Nasal Cannula 5.0 


 


11/27/18 17:50 100.3 98 18 135/59 (84) 96   


 


11/27/18 14:46 99.9       


 


11/27/18 14:00 100.9 95 13 115/97 93 Nasal Cannula  


 


11/27/18 14:00  95 13   Nasal Cannula  93


 


11/27/18 13:27 97.9   128/48    

















Intake and Output  


 


 11/27/18 11/28/18





 18:59 06:59


 


  


 


# Voids  3


 


# Bowel Movements 3 2











Laboratory Tests








Test


  11/27/18


13:30 11/27/18


13:45 11/27/18


14:20 11/27/18


19:05


 


Sodium Level


  127 MMOL/L


(136-145)  L 


  


  


 


 


Potassium Level


  4.4 MMOL/L


(3.5-5.1) 


  


  


 


 


Chloride Level


  95 MMOL/L


()  L 


  


  


 


 


Carbon Dioxide Level


  25 MMOL/L


(21-32) 


  


  


 


 


Anion Gap


  7 mmol/L


(5-15) 


  


  


 


 


Blood Urea Nitrogen


  24 mg/dL


(7-18)  H 


  


  


 


 


Creatinine


  0.5 MG/DL


(0.55-1.30)  L 


  


  


 


 


Estimat Glomerular Filtration


Rate  mL/min (>60)  


  


  


  


 


 


Glucose Level


  109 MG/DL


()  H 


  


  


 


 


Calcium Level


  8.8 MG/DL


(8.5-10.1) 


  


  


 


 


Magnesium Level


  1.8 MG/DL


(1.8-2.4) 


  


  


 


 


Total Bilirubin


  1.3 MG/DL


(0.2-1.0)  H 


  


  


 


 


Direct Bilirubin


  0.6 MG/DL


(0.0-0.3)  H 


  


  


 


 


Aspartate Amino Transf


(AST/SGOT) 105 U/L


(15-37)  H 


  


  


 


 


Alanine Aminotransferase


(ALT/SGPT) 89 U/L (12-78)


H 


  


  


 


 


Alkaline Phosphatase


  136 U/L


()  H 


  


  


 


 


Total Creatine Kinase


  88 U/L


() 


  


  


 


 


Pro-B-Type Natriuretic Peptide


  1247 pg/mL


(0-125)  H 


  


  


 


 


Total Protein


  7.5 G/DL


(6.4-8.2) 


  


  


 


 


Albumin


  2.5 G/DL


(3.4-5.0)  L 


  


  


 


 


Globulin 5.0 g/dL     


 


Albumin/Globulin Ratio


  0.5 (1.0-2.7)


L 


  


  


 


 


White Blood Count


  


  9.8 K/UL


(4.8-10.8) 


  


 


 


Red Blood Count


  


  3.83 M/UL


(4.70-6.10)  L 


  


 


 


Hemoglobin


  


  12.3 G/DL


(14.2-18.0)  L 


  


 


 


Hematocrit


  


  35.6 %


(42.0-52.0)  L 


  


 


 


Mean Corpuscular Volume  93 FL (80-99)    


 


Mean Corpuscular Hemoglobin


  


  32.2 PG


(27.0-31.0)  H 


  


 


 


Mean Corpuscular Hemoglobin


Concent 


  34.6 G/DL


(32.0-36.0) 


  


 


 


Red Cell Distribution Width


  


  10.0 %


(11.6-14.8)  L 


  


 


 


Platelet Count


  


  181 K/UL


(150-450) 


  


 


 


Mean Platelet Volume


  


  7.5 FL


(6.5-10.1) 


  


 


 


Neutrophils (%) (Auto)


  


  74.3 %


(45.0-75.0) 


  


 


 


Lymphocytes (%) (Auto)


  


  13.2 %


(20.0-45.0)  L 


  


 


 


Monocytes (%) (Auto)


  


  11.9 %


(1.0-10.0)  H 


  


 


 


Eosinophils (%) (Auto)


  


  0.0 %


(0.0-3.0) 


  


 


 


Basophils (%) (Auto)


  


  0.6 %


(0.0-2.0) 


  


 


 


Prothrombin Time


  


  11.5 SEC


(9.30-11.50) 


  


 


 


Prothromb Time International


Ratio 


  1.1 (0.9-1.1)  


  


  


 


 


Activated Partial


Thromboplast Time 


  32 SEC (23-33)


  


  


 


 


Lactic Acid Level


  


  1.40 mmol/L


(0.4-2.0) 


  


 


 


Troponin I


  


  0.011 ng/mL


(0.000-0.056) 


  


 


 


C-Reactive Protein,


Quantitative 


  4.8 mg/dL


(0.00-0.90)  H 


  


 


 


Urine Color   Yellow   


 


Urine Appearance   Clear   


 


Urine pH   5 (4.5-8.0)   


 


Urine Specific Gravity


  


  


  1.020


(1.005-1.035) 


 


 


Urine Protein


  


  


  1+ (NEGATIVE)


H 


 


 


Urine Glucose (UA)


  


  


  Negative


(NEGATIVE) 


 


 


Urine Ketones


  


  


  Negative


(NEGATIVE) 


 


 


Urine Blood


  


  


  2+ (NEGATIVE)


H 


 


 


Urine Nitrite


  


  


  Negative


(NEGATIVE) 


 


 


Urine Bilirubin


  


  


  Negative


(NEGATIVE) 


 


 


Urine Urobilinogen


  


  


  1 MG/DL


(0.0-1.0)  H 


 


 


Urine Leukocyte Esterase


  


  


  1+ (NEGATIVE)


H 


 


 


Urine RBC


  


  


  2-4 /HPF (0 -


0)  H 


 


 


Urine WBC


  


  


  0-2 /HPF (0 -


0) 


 


 


Urine Squamous Epithelial


Cells 


  


  Occasional


/LPF 


 


 


Urine Amorphous Sediment


  


  


  Few /LPF


(NONE)  H 


 


 


Urine Bacteria


  


  


  Occasional


/HPF (NONE) 


 


 


Urine Mucus


  


  


  Few /LPF


(NONE/OCC)  H 


 


 


Hepatitis A IgM Antibody    Pending  


 


Hepatitis B Surface Antigen    Pending  


 


Hepatitis B Core IgM Antibody    Pending  


 


Hepatitis C Antibody    Pending  


 


Test


  11/28/18


05:55 


  


  


 


 


White Blood Count


  11.3 K/UL


(4.8-10.8)  H 


  


  


 


 


Red Blood Count


  2.98 M/UL


(4.70-6.10)  L 


  


  


 


 


Hemoglobin


  9.6 G/DL


(14.2-18.0)  L 


  


  


 


 


Hematocrit


  27.8 %


(42.0-52.0)  L 


  


  


 


 


Mean Corpuscular Volume 94 FL (80-99)     


 


Mean Corpuscular Hemoglobin


  32.4 PG


(27.0-31.0)  H 


  


  


 


 


Mean Corpuscular Hemoglobin


Concent 34.7 G/DL


(32.0-36.0) 


  


  


 


 


Red Cell Distribution Width


  10.3 %


(11.6-14.8)  L 


  


  


 


 


Platelet Count


  139 K/UL


(150-450)  L 


  


  


 


 


Mean Platelet Volume


  6.8 FL


(6.5-10.1) 


  


  


 


 


Neutrophils (%) (Auto)


  79.4 %


(45.0-75.0)  H 


  


  


 


 


Lymphocytes (%) (Auto)


  11.6 %


(20.0-45.0)  L 


  


  


 


 


Monocytes (%) (Auto)


  8.3 %


(1.0-10.0) 


  


  


 


 


Eosinophils (%) (Auto)


  0.0 %


(0.0-3.0) 


  


  


 


 


Basophils (%) (Auto)


  0.7 %


(0.0-2.0) 


  


  


 


 


Sodium Level


  130 MMOL/L


(136-145)  L 


  


  


 


 


Potassium Level


  4.3 MMOL/L


(3.5-5.1) 


  


  


 


 


Chloride Level


  100 MMOL/L


() 


  


  


 


 


Carbon Dioxide Level


  25 MMOL/L


(21-32) 


  


  


 


 


Anion Gap


  5 mmol/L


(5-15) 


  


  


 


 


Blood Urea Nitrogen


  24 mg/dL


(7-18)  H 


  


  


 


 


Creatinine


  0.6 MG/DL


(0.55-1.30) 


  


  


 


 


Estimat Glomerular Filtration


Rate  mL/min (>60)  


  


  


  


 


 


Glucose Level


  136 MG/DL


()  H 


  


  


 


 


Uric Acid


  2.0 MG/DL


(2.6-7.2)  L 


  


  


 


 


Calcium Level


  8.1 MG/DL


(8.5-10.1)  L 


  


  


 


 


Phosphorus Level


  2.1 MG/DL


(2.5-4.9)  L 


  


  


 


 


Magnesium Level


  1.6 MG/DL


(1.8-2.4)  L 


  


  


 


 


Iron Level


  30 ug/dL


()  L 


  


  


 


 


Total Iron Binding Capacity


  203 ug/dL


(250-450)  L 


  


  


 


 


Percent Iron Saturation 15 % (15-50)     


 


Unsaturated Iron Binding


  173 ug/dL


(112-346) 


  


  


 


 


Ferritin


  372 NG/ML


(8-388) 


  


  


 


 


Total Bilirubin


  1.2 MG/DL


(0.2-1.0)  H 


  


  


 


 


Direct Bilirubin


  0.7 MG/DL


(0.0-0.3)  H 


  


  


 


 


Gamma Glutamyl Transpeptidase 56 U/L (5-85)     


 


Aspartate Amino Transf


(AST/SGOT) 70 U/L (15-37)


H 


  


  


 


 


Alanine Aminotransferase


(ALT/SGPT) 66 U/L (12-78)


  


  


  


 


 


Alkaline Phosphatase


  91 U/L


() 


  


  


 


 


Troponin I


  0.036 ng/mL


(0.000-0.056) 


  


  


 


 


Pro-B-Type Natriuretic Peptide


  4079 pg/mL


(0-125)  H 


  


  


 


 


Total Protein


  6.2 G/DL


(6.4-8.2)  L 


  


  


 


 


Albumin


  2.0 G/DL


(3.4-5.0)  L 


  


  


 


 


Globulin 4.2 g/dL     


 


Albumin/Globulin Ratio


  0.5 (1.0-2.7)


L 


  


  


 


 


Triglycerides Level


  25 MG/DL


()  L 


  


  


 


 


Cholesterol Level


  62 MG/DL (<


200) 


  


  


 


 


LDL Cholesterol


  40 mg/dL


(<100) 


  


  


 


 


HDL Cholesterol


  34 MG/DL


(40-60)  L 


  


  


 


 


Cholesterol/HDL Ratio


  1.8 (3.3-4.4)


L 


  


  


 


 


Vitamin B12 Level


  455 PG/ML


(193-986) 


  


  


 


 


Folate


  22.9 NG/ML


(8.6-58.9) 


  


  


 


 


Thyroid Stimulating Hormone


(TSH) 5.674 uiU/mL


(0.358-3.740) 


  


  


 











Microbiology








 Date/Time


Source Procedure


Growth Status


 


 


 11/27/18 13:45


Nasal Nares Influenza Types A,B Antigen (JEANNETTE) - Final Complete








Height (Feet):  5


Height (Inches):  5.00


Weight (Pounds):  117


Medications





Current Medications








 Medications


  (Trade)  Dose


 Ordered  Sig/Benita


 Route


 PRN Reason  Start Time


 Stop Time Status Last Admin


Dose Admin


 


 Acetaminophen


  (Tylenol)  650 mg  Q4H  PRN


 GT


 Mild Pain/Temp > 100.5  11/27/18 18:15


 12/27/18 18:14   


 


 


 Heparin Sodium


  (Porcine)


  (Heparin 5000


 units/ml)  5,000 units  EVERY 12  HOURS


 SUBQ


   11/27/18 21:00


 12/27/18 20:59  11/27/18 21:18


 


 


 Iron Sucrose 200


 mg/Sodium Chloride  120 ml @ 


 240 mls/hr  ONCE


 IV


   11/28/18 16:00


 11/28/18 18:00   


 


 


 Levothyroxine


 Sodium


  (Synthroid)  25 mcg  DAILY@0630


 GT


   11/29/18 06:30


 12/29/18 06:29   


 


 


 Metoclopramide HCl


  (Reglan)  10 mg  Q6H  PRN


 IVP


 Nausea & Vomiting  11/27/18 18:15


 12/27/18 18:14   


 


 


 Pantoprazole


  (Protonix)  40 mg  EVERY 12  HOURS


 IVP


   11/27/18 21:00


 12/27/18 20:59  11/28/18 09:35


 


 


 Piperacillin Sod/


 Tazobactam Sod


 3.375 gm/Dextrose  110 ml @ 


 27.5 mls/hr  EVERY 8  HOURS


 IVPB


   11/27/18 22:00


 12/2/18 21:59  11/28/18 05:20


 


 


 Potassium


 Phosphate 20 mm/


 Sodium Chloride  281.6667


 ml @ 


 46.944 m...  ONCE


 IV


   11/28/18 10:00


 11/28/18 18:00  11/28/18 11:28


 


 


 Tramadol HCl


  (Ultram)  25 mg  Q8H


 GT


   11/27/18 18:30


 12/4/18 18:29  11/28/18 11:21


 


 


 Vancomycin HCl


  (Vanco rx to


 dose)  1 ea  DAILY  PRN


 MISC


 Per rx protocol  11/27/18 18:45


 12/27/18 18:44   


 


 


 Vancomycin/Sodium


 Chloride  250 ml @ 


 166.667


 mls/hr  Q24H


 IVPB


   11/28/18 17:00


 12/3/18 16:59   


 











Assessment/Plan


Problem List:  


(1) Aspiration pneumonia


ICD Codes:  J69.0 - Pneumonitis due to inhalation of food and vomit


SNOMED:  410951542


(2) Sepsis


ICD Codes:  A41.9 - Sepsis, unspecified organism


SNOMED:  86191571


(3) CAD (coronary artery disease)


ICD Codes:  I25.10 - Atherosclerotic heart disease of native coronary artery 

without angina pectoris


SNOMED:  11974035


(4) Severe protein-calorie malnutrition


ICD Codes:  E43 - Unspecified severe protein-calorie malnutrition


SNOMED:  574641838


(5) Anemia


ICD Codes:  D64.9 - Anemia, unspecified


SNOMED:  265058125


(6) S/P percutaneous endoscopic gastrostomy (PEG) tube placement


ICD Codes:  Z93.1 - Gastrostomy status


SNOMED:  948924908


(7) Dementia


ICD Codes:  F03.90 - Unspecified dementia without behavioral disturbance


SNOMED:  64234229


Assessment/Plan


respiratory treatment


iv abx


check cultures


symptomatic treatment





consider end of life care, including stopping the feeding tube. doubt that the 

DOP[facility] will agree.











Lina Parker MD Nov 28, 2018 12:19

## 2018-11-28 NOTE — GENERAL PROGRESS NOTE
Assessment/Plan


Problem List:  


(1) Aspiration pneumonia


ICD Codes:  J69.0 - Pneumonitis due to inhalation of food and vomit


SNOMED:  182711842


(2) Hyponatremia


ICD Codes:  E87.1 - Hypo-osmolality and hyponatremia


SNOMED:  14565263


(3) Hypoalbuminemia


ICD Codes:  E88.09 - Other disorders of plasma-protein metabolism, not 

elsewhere classified


SNOMED:  908909302


(4) Anemia


ICD Codes:  D64.9 - Anemia, unspecified


SNOMED:  795668765


(5) Elevated liver enzymes


ICD Codes:  R74.8 - Abnormal levels of other serum enzymes


SNOMED:  947204233


Status:  stable


Status Narrative


pneumonia


Malnutrition


UTI


HypoNatremia


PEG


Anemia


Dementia


elevated LFTs


Previously on Hospice


DNR DNI


Assessment/Plan





replace mag phos K as needed


Antibiotics


pulm toilet


monitor labs lytes


per ID and pulm advise





Subjective


ROS Limited/Unobtainable:  Yes


Constitutional:  Reports: other - non verbal


Allergies:  


Coded Allergies:  


     NO KNOWN DRUG ALLERGIES (Unverified  Allergy, Unknown, 1/28/16)





Objective





Last 24 Hour Vital Signs








  Date Time  Temp Pulse Resp B/P (MAP) Pulse Ox O2 Delivery O2 Flow Rate FiO2


 


11/28/18 08:00 98.1 92 19 126/44 (71) 98   


 


11/28/18 04:00 98.7 83 18 128/68 (88) 95   


 


11/28/18 04:00  81      


 


11/28/18 00:00 99.9 86 22 115/64 (81) 96   


 


11/28/18 00:00  91      


 


11/27/18 21:00      Nasal Cannula 3.0 


 


11/27/18 20:00  95      


 


11/27/18 20:00 97.4 101 25 154/60 (91) 96   


 


11/27/18 17:56      Nasal Cannula 3.0 


 


11/27/18 17:52 99.8 101 22 114/95 96 Nasal Cannula 5.0 


 


11/27/18 17:50 100.3 98 18 135/59 (84) 96   


 


11/27/18 14:46 99.9       


 


11/27/18 14:00 100.9 95 13 115/97 93 Nasal Cannula  


 


11/27/18 14:00  95 13   Nasal Cannula  93


 


11/27/18 13:27 97.9   128/48    

















Intake and Output  


 


 11/27/18 11/28/18





 18:59 06:59


 


  


 


# Voids  3


 


# Bowel Movements 3 2








Laboratory Tests


11/27/18 13:30: 


Sodium Level 127L, Potassium Level 4.4, Chloride Level 95L, Carbon Dioxide 

Level 25, Anion Gap 7, Blood Urea Nitrogen 24H, Creatinine 0.5L, Estimat 

Glomerular Filtration Rate , Glucose Level 109H, Calcium Level 8.8, Magnesium 

Level 1.8, Total Bilirubin 1.3H, Direct Bilirubin 0.6H, Aspartate Amino Transf (

AST/SGOT) 105H, Alanine Aminotransferase (ALT/SGPT) 89H, Alkaline Phosphatase 

136H, Total Creatine Kinase 88, Pro-B-Type Natriuretic Peptide 1247H, Total 

Protein 7.5, Albumin 2.5L, Globulin 5.0, Albumin/Globulin Ratio 0.5L


11/27/18 13:45: 


White Blood Count 9.8, Red Blood Count 3.83L, Hemoglobin 12.3L, Hematocrit 35.6L

, Mean Corpuscular Volume 93, Mean Corpuscular Hemoglobin 32.2H, Mean 

Corpuscular Hemoglobin Concent 34.6, Red Cell Distribution Width 10.0L, 

Platelet Count 181, Mean Platelet Volume 7.5, Neutrophils (%) (Auto) 74.3, 

Lymphocytes (%) (Auto) 13.2L, Monocytes (%) (Auto) 11.9H, Eosinophils (%) (Auto

) 0.0, Basophils (%) (Auto) 0.6, Prothrombin Time 11.5, Prothromb Time 

International Ratio 1.1, Activated Partial Thromboplast Time 32, Lactic Acid 

Level 1.40, Troponin I 0.011, C-Reactive Protein, Quantitative 4.8H


11/27/18 14:20: 


Urine Color Yellow, Urine Appearance Clear, Urine pH 5, Urine Specific Gravity 

1.020, Urine Protein 1+H, Urine Glucose (UA) Negative, Urine Ketones Negative, 

Urine Blood 2+H, Urine Nitrite Negative, Urine Bilirubin Negative, Urine 

Urobilinogen 1H, Urine Leukocyte Esterase 1+H, Urine RBC 2-4H, Urine WBC 0-2, 

Urine Squamous Epithelial Cells Occasional, Urine Amorphous Sediment FewH, 

Urine Bacteria Occasional, Urine Mucus FewH


11/27/18 19:05: 


Hepatitis A IgM Antibody [Pending], Hepatitis B Surface Antigen [Pending], 

Hepatitis B Core IgM Antibody [Pending], Hepatitis C Antibody [Pending]


11/28/18 05:55: 


White Blood Count 11.3H, Red Blood Count 2.98L, Hemoglobin 9.6L, Hematocrit 

27.8L, Mean Corpuscular Volume 94, Mean Corpuscular Hemoglobin 32.4H, Mean 

Corpuscular Hemoglobin Concent 34.7, Red Cell Distribution Width 10.3L, 

Platelet Count 139L, Mean Platelet Volume 6.8, Neutrophils (%) (Auto) 79.4H, 

Lymphocytes (%) (Auto) 11.6L, Monocytes (%) (Auto) 8.3, Eosinophils (%) (Auto) 

0.0, Basophils (%) (Auto) 0.7, Sodium Level 130L, Potassium Level 4.3, Chloride 

Level 100, Carbon Dioxide Level 25, Anion Gap 5, Blood Urea Nitrogen 24H, 

Creatinine 0.6, Estimat Glomerular Filtration Rate , Glucose Level 136H, Uric 

Acid 2.0L, Calcium Level 8.1L, Phosphorus Level 2.1L, Magnesium Level 1.6L, 

Iron Level 30L, Total Iron Binding Capacity 203L, Percent Iron Saturation 15, 

Unsaturated Iron Binding 173, Ferritin 372, Total Bilirubin 1.2H, Direct 

Bilirubin 0.7H, Gamma Glutamyl Transpeptidase 56, Aspartate Amino Transf (AST/

SGOT) 70H, Alanine Aminotransferase (ALT/SGPT) 66, Alkaline Phosphatase 91, 

Troponin I 0.036, Pro-B-Type Natriuretic Peptide 4079H, Total Protein 6.2L, 

Albumin 2.0L, Globulin 4.2, Albumin/Globulin Ratio 0.5L, Triglycerides Level 25L

, Cholesterol Level 62, LDL Cholesterol 40, HDL Cholesterol 34L, Cholesterol/

HDL Ratio 1.8L, Vitamin B12 Level 455, Folate 22.9, Thyroid Stimulating Hormone 

(TSH) 5.674H


Height (Feet):  5


Height (Inches):  5.00


Weight (Pounds):  117


General Appearance:  no apparent distress


Neck:  limited range of motion


Cardiovascular:  tachycardia


Respiratory/Chest:  decreased breath sounds


Abdomen:  soft, other - PEG


Extremities:  other - contracted


Objective


no other changes











Morales Hill MD Nov 28, 2018 11:09

## 2018-11-28 NOTE — CARDIOLOGY REPORT
--------------- APPROVED REPORT --------------





EKG Measurement

Heart [id number]

DC 130P70

[id number]

[id number]

[id number]





Normal sinus rhythm

Left anterior fascicular block

Possible Lateral infarct, age undetermined

Inferior infarct, age undetermined

Abnormal ECG

## 2018-11-28 NOTE — INFECTIOUS DISEASES PROG NOTE
Assessment/Plan


Problems:  


(1) Aspiration pneumonia


Assessment & Plan:  with left perihilar infiltrates , await  sputum culture, 

continue  vancomycin with zosyn empirically, recommend aspiration precaution 

and keep HOB > 30 degree all the time 





(2) Sepsis


Assessment & Plan:  due to the above, continue wide spectrum antibiotics 

pending blood culture 





(3) Abdominal wall cellulitis


Assessment & Plan:  he will be already on wide spectrum antibiotics , continue 

local wound care .





(4) Fever


Assessment & Plan:  due to the above , continue tylenol and wide spectrum 

antibiotics 








Subjective


ROS Limited/Unobtainable:  Yes


Allergies:  


Coded Allergies:  


     NO KNOWN DRUG ALLERGIES (Unverified  Allergy, Unknown, 1/28/16)


Subjective


he was comfortable, lying in bed, contracted, open eyes spontaneously . afebrile





Objective


Vital Signs





Last 24 Hour Vital Signs








  Date Time  Temp Pulse Resp B/P (MAP) Pulse Ox O2 Delivery O2 Flow Rate FiO2


 


11/28/18 12:00 98.1 81 20 100/53 (69) 100   


 


11/28/18 11:50  80      


 


11/28/18 11:50  80      


 


11/28/18 09:00      Nasal Cannula 3.0 


 


11/28/18 08:00 98.1 92 19 126/44 (71) 98   


 


11/28/18 07:50  84      


 


11/28/18 04:00 98.7 83 18 128/68 (88) 95   


 


11/28/18 04:00  81      


 


11/28/18 00:00 99.9 86 22 115/64 (81) 96   


 


11/28/18 00:00  91      


 


11/27/18 21:00      Nasal Cannula 3.0 


 


11/27/18 20:00  95      


 


11/27/18 20:00 97.4 101 25 154/60 (91) 96   


 


11/27/18 17:56      Nasal Cannula 3.0 


 


11/27/18 17:52 99.8 101 22 114/95 96 Nasal Cannula 5.0 


 


11/27/18 17:50 100.3 98 18 135/59 (84) 96   


 


11/27/18 14:46 99.9       








Height (Feet):  5


Height (Inches):  5.00


Weight (Pounds):  117


General Appearance:  WD/WN, no acute distress, cachetic, other - contracted


HEENT:  normocephalic, anicteric, mucous membranes moist, PERRL, pharynx normal

, supple, no JVD


Respiratory/Chest:  normal breath sounds, no respiratory distress, no accessory 

muscle use, decreased breath sounds, crackles/rales


Cardiovascular:  normal peripheral pulses, normal rate, regular rhythm, no 

gallop/murmur, no JVD


Abdomen:  normal bowel sounds, soft, non tender, no organomegaly, non distended

, no mass, no scars


Extremities:  no cyanosis, no clubbing


Skin:  no rash, no lesions, no ulcers


Neurologic/Psychiatric:  alert


Lymphatic:  no neck adenopathy, no groin adenopathy


Musculoskeletal:  normal muscle bulk, no effusion





Microbiology








 Date/Time


Source Procedure


Growth Status


 


 


 11/28/18 08:45


Sputum Expectorated Gram Stain - Final Resulted


 


 11/28/18 08:45


Sputum Expectorated Sputum Culture


Pending Resulted


 


 11/27/18 13:45


Nasal Nares Influenza Types A,B Antigen (JEANNETTE) - Final Complete











Laboratory Tests








Test


  11/27/18


19:05 11/28/18


05:55


 


Hepatitis A IgM Antibody Pending   


 


Hepatitis B Surface Antigen Pending   


 


Hepatitis B Core IgM Antibody Pending   


 


Hepatitis C Antibody Pending   


 


White Blood Count


  


  11.3 K/UL


(4.8-10.8)  H


 


Red Blood Count


  


  2.98 M/UL


(4.70-6.10)  L


 


Hemoglobin


  


  9.6 G/DL


(14.2-18.0)  L


 


Hematocrit


  


  27.8 %


(42.0-52.0)  L


 


Mean Corpuscular Volume  94 FL (80-99)  


 


Mean Corpuscular Hemoglobin


  


  32.4 PG


(27.0-31.0)  H


 


Mean Corpuscular Hemoglobin


Concent 


  34.7 G/DL


(32.0-36.0)


 


Red Cell Distribution Width


  


  10.3 %


(11.6-14.8)  L


 


Platelet Count


  


  139 K/UL


(150-450)  L


 


Mean Platelet Volume


  


  6.8 FL


(6.5-10.1)


 


Neutrophils (%) (Auto)


  


  79.4 %


(45.0-75.0)  H


 


Lymphocytes (%) (Auto)


  


  11.6 %


(20.0-45.0)  L


 


Monocytes (%) (Auto)


  


  8.3 %


(1.0-10.0)


 


Eosinophils (%) (Auto)


  


  0.0 %


(0.0-3.0)


 


Basophils (%) (Auto)


  


  0.7 %


(0.0-2.0)


 


Sodium Level


  


  130 MMOL/L


(136-145)  L


 


Potassium Level


  


  4.3 MMOL/L


(3.5-5.1)


 


Chloride Level


  


  100 MMOL/L


()


 


Carbon Dioxide Level


  


  25 MMOL/L


(21-32)


 


Anion Gap


  


  5 mmol/L


(5-15)


 


Blood Urea Nitrogen


  


  24 mg/dL


(7-18)  H


 


Creatinine


  


  0.6 MG/DL


(0.55-1.30)


 


Estimat Glomerular Filtration


Rate 


   mL/min (>60)  


 


 


Glucose Level


  


  136 MG/DL


()  H


 


Uric Acid


  


  2.0 MG/DL


(2.6-7.2)  L


 


Calcium Level


  


  8.1 MG/DL


(8.5-10.1)  L


 


Phosphorus Level


  


  2.1 MG/DL


(2.5-4.9)  L


 


Magnesium Level


  


  1.6 MG/DL


(1.8-2.4)  L


 


Iron Level


  


  30 ug/dL


()  L


 


Total Iron Binding Capacity


  


  203 ug/dL


(250-450)  L


 


Percent Iron Saturation  15 % (15-50)  


 


Unsaturated Iron Binding


  


  173 ug/dL


(112-346)


 


Ferritin


  


  372 NG/ML


(8-388)


 


Total Bilirubin


  


  1.2 MG/DL


(0.2-1.0)  H


 


Direct Bilirubin


  


  0.7 MG/DL


(0.0-0.3)  H


 


Gamma Glutamyl Transpeptidase  56 U/L (5-85)  


 


Aspartate Amino Transf


(AST/SGOT) 


  70 U/L (15-37)


H


 


Alanine Aminotransferase


(ALT/SGPT) 


  66 U/L (12-78)


 


 


Alkaline Phosphatase


  


  91 U/L


()


 


Troponin I


  


  0.036 ng/mL


(0.000-0.056)


 


Pro-B-Type Natriuretic Peptide


  


  4079 pg/mL


(0-125)  H


 


Total Protein


  


  6.2 G/DL


(6.4-8.2)  L


 


Albumin


  


  2.0 G/DL


(3.4-5.0)  L


 


Globulin  4.2 g/dL  


 


Albumin/Globulin Ratio


  


  0.5 (1.0-2.7)


L


 


Triglycerides Level


  


  25 MG/DL


()  L


 


Cholesterol Level


  


  62 MG/DL (<


200)


 


LDL Cholesterol


  


  40 mg/dL


(<100)


 


HDL Cholesterol


  


  34 MG/DL


(40-60)  L


 


Cholesterol/HDL Ratio


  


  1.8 (3.3-4.4)


L


 


Vitamin B12 Level


  


  455 PG/ML


(193-986)


 


Folate


  


  22.9 NG/ML


(8.6-58.9)


 


Thyroid Stimulating Hormone


(TSH) 


  5.674 uiU/mL


(0.358-3.740)











Current Medications








 Medications


  (Trade)  Dose


 Ordered  Sig/Benita


 Route


 PRN Reason  Start Time


 Stop Time Status Last Admin


Dose Admin


 


 Acetaminophen


  (Tylenol)  650 mg  Q4H  PRN


 GT


 Mild Pain/Temp > 100.5  11/27/18 18:15


 12/27/18 18:14   


 


 


 Heparin Sodium


  (Porcine)


  (Heparin 5000


 units/ml)  5,000 units  EVERY 12  HOURS


 SUBQ


   11/27/18 21:00


 12/27/18 20:59  11/27/18 21:18


 


 


 Iron Sucrose 200


 mg/Sodium Chloride  120 ml @ 


 240 mls/hr  ONCE


 IV


   11/28/18 16:00


 11/28/18 18:00   


 


 


 Levothyroxine


 Sodium


  (Synthroid)  25 mcg  DAILY@0630


 GT


   11/29/18 06:30


 12/29/18 06:29   


 


 


 Metoclopramide HCl


  (Reglan)  10 mg  Q6H  PRN


 IVP


 Nausea & Vomiting  11/27/18 18:15


 12/27/18 18:14   


 


 


 Pantoprazole


  (Protonix)  40 mg  EVERY 12  HOURS


 IVP


   11/27/18 21:00


 12/27/18 20:59  11/28/18 09:35


 


 


 Piperacillin Sod/


 Tazobactam Sod


 3.375 gm/Dextrose  110 ml @ 


 27.5 mls/hr  EVERY 8  HOURS


 IVPB


   11/27/18 22:00


 12/2/18 21:59  11/28/18 05:20


 


 


 Potassium


 Phosphate 20 mm/


 Sodium Chloride  281.6667


 ml @ 


 46.944 m...  ONCE


 IV


   11/28/18 10:00


 11/28/18 18:00  11/28/18 11:28


 


 


 Tramadol HCl


  (Ultram)  25 mg  Q8H


 GT


   11/27/18 18:30


 12/4/18 18:29  11/28/18 11:21


 


 


 Vancomycin HCl


  (Vanco rx to


 dose)  1 ea  DAILY  PRN


 MISC


 Per rx protocol  11/27/18 18:45


 12/27/18 18:44   


 


 


 Vancomycin/Sodium


 Chloride  250 ml @ 


 166.667


 mls/hr  Q24H


 IVPB


   11/28/18 17:00


 12/3/18 16:59   


 

















Ricky Espinal M.D. Nov 28, 2018 14:31

## 2018-11-28 NOTE — CONSULTATION
DATE OF CONSULTATION:  11/27/2018

INFECTIOUS DISEASE CONSULTATION



CONSULTING PHYSICIAN:  iRcky Espinal M.D.



REQUESTING PHYSICIAN:  Morales Hill M.D.



REASON FOR CONSULTATION:  Aspiration pneumonia with left perihilar

infiltrate, fever, chills, and sepsis with abdominal wall cellulitis.

Recommendation for antibiotics treatment.



HISTORY OF PRESENT ILLNESS:  The patient is an 84-year-old male with past

medical history of coronary artery disease; anemia; hypertension;

dysphagia, status post G-tube placement; encephalopathy; CVA; dementia;

spinal stenosis; aphasia; and contraction, was sent to [facility] from [facility] for fever and cough.  The patient was found to

be hypoxemic in the emergency room with O2 saturation of 93% and had

temperature of 97.9 with pulse of 95.  The patient's lab showed evidence

of urinary tract infection.  His chest x-ray showed evidence of left

perihilar infiltrates concerning for pneumonia, so he was started on

vancomycin and Zosyn empiric coverage and Infectious Disease consultation

was requested for antibiotics treatment and further management.  The

patient had influenza screening in the emergency room, which was negative.

As of note, the patient is poor historian, cannot provide any history.

History was mainly obtained from the medical record.



PAST MEDICAL HISTORY:  Significant for hypertension; coronary artery

disease; anemia; dementia; CVA; previous UTI; dysphagia, status post

G-tube placement; muscle contraction; and hypothyroidism.



PAST SURGICAL HISTORY:  He had G-tube placement.



FAMILY HISTORY:  Noncontributory.



SOCIAL HISTORY:  The patient lives at the nursing home.  No recent drugs,

tobacco, or alcohol.



ALLERGIES:  No known drug allergies.



MEDICATIONS:  The patient received vancomycin, cefepime, and levofloxacin

in the emergency room and he was started also on Zosyn.



PHYSICAL EXAMINATION:

VITAL SIGNS:  Temperature 99.8, pulse 101, respiration 22, blood pressure

114/95, and saturation 96% on 5 L nasal cannula.

GENERAL:  Elderly male, contracted with G-tube, nonverbal.  Makes eye

contact.

HEENT:  Normocephalic and atraumatic.  Multiple skin abrasions on his

scalp.  Pupils are reactive to light.  Dry sticky secretion in both eyes.

Unable to assess oral mucosa.

NECK:  Supple.  No lymphadenopathy.

CARDIOVASCULAR:  He is tachycardic.  S1 and S2 normal.

LUNGS:  He had diminished breathing sounds at the bases with crackles on

the left side.  Normal breathing efforts.

ABDOMEN:  Soft.  Nondistended.  Nontender.  Mild erythema and redness

around the G-tube site area with abdominal redness suggestive of

cellulitis.  No organomegaly.

EXTREMITIES:  Contracted with muscle atrophy and skin abrasions.



LABORATORY AND DIAGNOSTIC DATA:  Labs showed white count of 9.8, hemoglobin

of 12.3, and platelet count of 181,000.  BUN of 24 and creatinine 0.5.

AST of 105, ALT of 89, and alkaline phosphatase of 136.



Urinalysis showed +1 leukocyte esterase, wbc's 0 to 2, and occasional

bacteria.  Microbiology, influenza screening A and B both negative.

Imaging, chest x-ray showed left perihilar infiltrates, unclear whether it

represents new infiltration.



ASSESSMENT AND RECOMMENDATIONS:

1. Aspiration pneumonia with left perihilar infiltrates.  We will send

sputum culture and start vancomycin with Zosyn empiric coverage.

Recommend aspiration precaution and keep head of bed more than 30-degree

all the time.  Monitor chest x-ray.

2. Fever with chills due to the above.  Continue Tylenol and

wide-spectrum antibiotics.

3. Sepsis due to the above.  Continue wide-spectrum antibiotics.

Pending blood culture.

4. Elevated liver enzymes, suspect liver shock due to sepsis.  We will

order hepatitis panel.  Close monitor of liver function tests.  Ultrasound

of the liver to rule out gallstone.

5. Abdominal wall cellulitis.  The patient will be already on

wide-spectrum antibiotics.  Continue local wound care as needed.



Thank you for the consult.  ID will continue to follow.









  ______________________________________________

  Ricky Espinal M.D.





DR:  JT

D:  11/28/2018 14:26

T:  11/28/2018 22:23

JOB#:  [id number]

CC:

## 2018-11-29 VITALS — SYSTOLIC BLOOD PRESSURE: 98 MMHG | DIASTOLIC BLOOD PRESSURE: 41 MMHG

## 2018-11-29 VITALS — DIASTOLIC BLOOD PRESSURE: 57 MMHG | SYSTOLIC BLOOD PRESSURE: 116 MMHG

## 2018-11-29 VITALS — SYSTOLIC BLOOD PRESSURE: 126 MMHG | DIASTOLIC BLOOD PRESSURE: 83 MMHG

## 2018-11-29 VITALS — DIASTOLIC BLOOD PRESSURE: 61 MMHG | SYSTOLIC BLOOD PRESSURE: 109 MMHG

## 2018-11-29 VITALS — SYSTOLIC BLOOD PRESSURE: 107 MMHG | DIASTOLIC BLOOD PRESSURE: 46 MMHG

## 2018-11-29 VITALS — DIASTOLIC BLOOD PRESSURE: 52 MMHG | SYSTOLIC BLOOD PRESSURE: 103 MMHG

## 2018-11-29 LAB
ADD MANUAL DIFF: YES
ALBUMIN SERPL-MCNC: 2.3 G/DL (ref 3.4–5)
ALBUMIN/GLOB SERPL: 0.6 {RATIO} (ref 1–2.7)
ALP SERPL-CCNC: 84 U/L (ref 46–116)
ALT SERPL-CCNC: 63 U/L (ref 12–78)
ANION GAP SERPL CALC-SCNC: 4 MMOL/L (ref 5–15)
AST SERPL-CCNC: 78 U/L (ref 15–37)
BILIRUB SERPL-MCNC: 0.7 MG/DL (ref 0.2–1)
BUN SERPL-MCNC: 20 MG/DL (ref 7–18)
CALCIUM SERPL-MCNC: 8 MG/DL (ref 8.5–10.1)
CHLORIDE SERPL-SCNC: 101 MMOL/L (ref 98–107)
CO2 SERPL-SCNC: 28 MMOL/L (ref 21–32)
CREAT SERPL-MCNC: 0.5 MG/DL (ref 0.55–1.3)
ERYTHROCYTE [DISTWIDTH] IN BLOOD BY AUTOMATED COUNT: 10.2 % (ref 11.6–14.8)
GLOBULIN SER-MCNC: 3.7 G/DL
HCT VFR BLD CALC: 25.8 % (ref 42–52)
HGB BLD-MCNC: 9.1 G/DL (ref 14.2–18)
MCV RBC AUTO: 93 FL (ref 80–99)
PHOSPHATE SERPL-MCNC: 2.1 MG/DL (ref 2.5–4.9)
PLATELET # BLD: 132 K/UL (ref 150–450)
POTASSIUM SERPL-SCNC: 3.6 MMOL/L (ref 3.5–5.1)
RBC # BLD AUTO: 2.78 M/UL (ref 4.7–6.1)
SODIUM SERPL-SCNC: 133 MMOL/L (ref 136–145)
WBC # BLD AUTO: 8.9 K/UL (ref 4.8–10.8)

## 2018-11-29 RX ADMIN — HEPARIN SODIUM SCH UNITS: 5000 INJECTION INTRAVENOUS; SUBCUTANEOUS at 21:00

## 2018-11-29 RX ADMIN — SODIUM CHLORIDE SCH MLS/HR: 0.9 INJECTION INTRAVENOUS at 21:00

## 2018-11-29 RX ADMIN — DEXTROSE MONOHYDRATE SCH MLS/HR: 50 INJECTION, SOLUTION INTRAVENOUS at 22:00

## 2018-11-29 RX ADMIN — TRAMADOL HYDROCHLORIDE SCH MG: 50 TABLET, FILM COATED ORAL at 10:23

## 2018-11-29 RX ADMIN — DEXTROSE MONOHYDRATE SCH MLS/HR: 50 INJECTION, SOLUTION INTRAVENOUS at 06:06

## 2018-11-29 RX ADMIN — DEXTROSE MONOHYDRATE SCH MLS/HR: 50 INJECTION, SOLUTION INTRAVENOUS at 14:25

## 2018-11-29 RX ADMIN — PANTOPRAZOLE SODIUM SCH MG: 40 INJECTION, POWDER, FOR SOLUTION INTRAVENOUS at 21:00

## 2018-11-29 RX ADMIN — TRAMADOL HYDROCHLORIDE SCH MG: 50 TABLET, FILM COATED ORAL at 17:54

## 2018-11-29 RX ADMIN — HEPARIN SODIUM SCH UNITS: 5000 INJECTION INTRAVENOUS; SUBCUTANEOUS at 08:32

## 2018-11-29 RX ADMIN — TRAMADOL HYDROCHLORIDE SCH MG: 50 TABLET, FILM COATED ORAL at 03:01

## 2018-11-29 RX ADMIN — PANTOPRAZOLE SODIUM SCH MG: 40 INJECTION, POWDER, FOR SOLUTION INTRAVENOUS at 08:48

## 2018-11-29 NOTE — PULMONOLOGY PROGRESS NOTE
Assessment/Plan


Problems:  


(1) Aspiration pneumonia


(2) Sepsis


(3) CAD (coronary artery disease)


(4) Severe protein-calorie malnutrition


(5) Anemia


(6) S/P percutaneous endoscopic gastrostomy (PEG) tube placement


(7) Dementia


Assessment/Plan


improving


opening eyes


check cultures


f/u wbc 


tolerating feeding


med/surg





Subjective


ROS Limited/Unobtainable:  No


Constitutional:  Reports: no symptoms


HEENT:  Repors: no symptoms


Allergies:  


Coded Allergies:  


     NO KNOWN DRUG ALLERGIES (Unverified  Allergy, Unknown, 1/28/16)





Objective





Last 24 Hour Vital Signs








  Date Time  Temp Pulse Resp B/P (MAP) Pulse Ox O2 Delivery O2 Flow Rate FiO2


 


11/29/18 12:00 97.9 50 17 126/83 (97) 98   


 


11/29/18 09:00      Nasal Cannula 3.0 


 


11/29/18 08:00 97.5 81 18 103/52 (69) 97   


 


11/29/18 07:38  71      


 


11/29/18 04:00 98.6 72 20 98/41 (60) 100   


 


11/29/18 04:00  69      


 


11/29/18 03:31 98.1       


 


11/29/18 00:00 98.1 71 20 107/46 (66) 99   


 


11/28/18 21:00      Nasal Cannula 3.0 


 


11/28/18 20:00 98.0 82 18 104/46 (65) 96   


 


11/28/18 20:00  76      


 


11/28/18 16:00 98.1 80 19 108/53 (71) 94   


 


11/28/18 15:48  80      

















Intake and Output  


 


 11/28/18 11/29/18





 18:59 06:59


 


Intake Total 730 ml 40 ml


 


Output Total 800 ml 400 ml


 


Balance -70 ml -360 ml


 


  


 


Intake Oral 300 ml 


 


Free Water 100 ml 


 


Tube Feeding 330 ml 40 ml


 


Output Urine Total 800 ml 400 ml


 


# Bowel Movements 1 








General Appearance:  cachetic


HEENT:  normocephalic, atraumatic


Respiratory/Chest:  chest wall non-tender, lungs clear


Cardiovascular:  normal peripheral pulses, normal rate


Abdomen:  normal bowel sounds, soft, non tender


Extremities:  no cyanosis


Skin:  no rash


Lymphatic:  no neck adenopathy





Microbiology








 Date/Time


Source Procedure


Growth Status


 


 


 11/27/18 13:45


Blood Blood Culture - Preliminary


NO GROWTH AFTER 24 HOURS Resulted


 


 11/27/18 13:30


Blood Blood Culture - Preliminary


NO GROWTH AFTER 24 HOURS Resulted





 11/28/18 08:45


Sputum Expectorated Gram Stain - Final Resulted


 


 11/28/18 08:45 Sputum Culture - Preliminary


Gram Negative Bacillus 1 Resulted


 


 11/27/18 13:45


Nasal Nares MRSA Culture - Final


NO METHICILLIN RESISTANT STAPH AUREUS... Complete


 


 11/27/18 13:45


Nasal Nares Influenza Types A,B Antigen (JEANNETTE) - Final Complete


 


 11/27/18 20:45


Indwelling Cath Urine Culture - Preliminary


NO GROWTH Resulted





 11/27/18 13:45


Rectum VRE Culture - Final


NO VANCOMYCIN RESISTANT ENTEROCOCCUS ... Complete


 


 11/27/18 13:45


Rectum - Final


NO CARBAPENEM-RESISTANT ENTEROBACTERI... Complete








Laboratory Tests


11/29/18 07:04: 


White Blood Count 8.9, Red Blood Count 2.78L, Hemoglobin 9.1L, Hematocrit 25.8L

, Mean Corpuscular Volume 93, Mean Corpuscular Hemoglobin 32.8H, Mean 

Corpuscular Hemoglobin Concent 35.3, Red Cell Distribution Width 10.2L, 

Platelet Count 132L, Mean Platelet Volume 7.9, Neutrophils (%) (Auto) , 

Lymphocytes (%) (Auto) , Monocytes (%) (Auto) , Eosinophils (%) (Auto) , 

Basophils (%) (Auto) , Differential Total Cells Counted 100, Neutrophils % (

Manual) 78H, Lymphocytes % (Manual) 11L, Monocytes % (Manual) 10, Eosinophils % 

(Manual) 0, Basophils % (Manual) 0, Band Neutrophils 1, Platelet Estimate 

DecreasedL, Platelet Morphology Normal, Sodium Level 133L, Potassium Level 3.6, 

Chloride Level 101, Carbon Dioxide Level 28, Anion Gap 4L, Blood Urea Nitrogen 

20H, Creatinine 0.5L, Estimat Glomerular Filtration Rate , Glucose Level 107H, 

Calcium Level 8.0L, Phosphorus Level 2.1L, Magnesium Level 2.1, Total Bilirubin 

0.7, Aspartate Amino Transf (AST/SGOT) 78H, Alanine Aminotransferase (ALT/SGPT) 

63, Alkaline Phosphatase 84, C-Reactive Protein, Quantitative 5.9H, Pro-B-Type 

Natriuretic Peptide 2924H, Total Protein 6.0L, Albumin 2.3L, Globulin 3.7, 

Albumin/Globulin Ratio 0.6L





Current Medications








 Medications


  (Trade)  Dose


 Ordered  Sig/Benita


 Route


 PRN Reason  Start Time


 Stop Time Status Last Admin


Dose Admin


 


 Acetaminophen


  (Tylenol)  650 mg  Q4H  PRN


 GT


 Mild Pain/Temp > 100.5  11/27/18 18:15


 12/27/18 18:14   


 


 


 Heparin Sodium


  (Porcine)


  (Heparin 5000


 units/ml)  5,000 units  EVERY 12  HOURS


 SUBQ


   11/27/18 21:00


 12/27/18 20:59  11/27/18 21:18


 


 


 Levothyroxine


 Sodium


  (Synthroid)  25 mcg  DAILY@0630


 GT


   11/29/18 06:30


 12/29/18 06:29  11/29/18 06:05


 


 


 Metoclopramide HCl


  (Reglan)  10 mg  Q6H  PRN


 IVP


 Nausea & Vomiting  11/27/18 18:15


 12/27/18 18:14   


 


 


 Pantoprazole


  (Protonix)  40 mg  EVERY 12  HOURS


 IVP


   11/27/18 21:00


 12/27/18 20:59  11/29/18 08:48


 


 


 Piperacillin Sod/


 Tazobactam Sod


 3.375 gm/Dextrose  110 ml @ 


 27.5 mls/hr  EVERY 8  HOURS


 IVPB


   11/27/18 22:00


 12/2/18 21:59  11/29/18 06:06


 


 


 Potassium


 Phosphate 20 mm/


 Sodium Chloride  281.6667


 ml @ 


 46.944 m...  ONCE


 IV


   11/29/18 10:00


 11/29/18 16:00  11/29/18 10:23


 


 


 Tramadol HCl


  (Ultram)  25 mg  Q8H


 GT


   11/27/18 18:30


 12/4/18 18:29  11/29/18 10:23


 


 


 Vancomycin HCl


  (Vanco rx to


 dose)  1 ea  DAILY  PRN


 MISC


 Per rx protocol  11/27/18 18:45


 12/27/18 18:44   


 


 


 Vancomycin/Sodium


 Chloride  250 ml @ 


 166.667


 mls/hr  Q24H


 IVPB


   11/28/18 17:00


 12/3/18 16:59  11/28/18 17:09


 

















Lina Parker MD Nov 29, 2018 12:54

## 2018-11-29 NOTE — INFECTIOUS DISEASES PROG NOTE
Assessment/Plan


Problems:  


(1) Aspiration pneumonia


Assessment & Plan:  due to gram negative rods , with left perihilar infiltrates 

, await  sputum culture, continue  zosyn empirically, and stop vancomycin , no 

evidence of MRSA on his nares  swab. recommend aspiration precaution and keep 

HOB > 30 degree all the time 





(2) Sepsis


Assessment & Plan:  due to the above, continue wide spectrum antibiotics 

pending blood culture 





(3) Abdominal wall cellulitis


Assessment & Plan:  he will be already on wide spectrum antibiotics , continue 

local wound care .





(4) Fever


Assessment & Plan:  due to the above , continue tylenol and wide spectrum 

antibiotics 








Subjective


ROS Limited/Unobtainable:  Yes


Allergies:  


Coded Allergies:  


     NO KNOWN DRUG ALLERGIES (Unverified  Allergy, Unknown, 1/28/16)


Subjective


he was comfortable, lying in bed, contracted, open eyes spontaneously . 

afebrile , no SOB, no wheezing





Objective


Vital Signs





Last 24 Hour Vital Signs








  Date Time  Temp Pulse Resp B/P (MAP) Pulse Ox O2 Delivery O2 Flow Rate FiO2


 


11/29/18 12:00 97.9 50 17 126/83 (97) 98   


 


11/29/18 11:46  71      


 


11/29/18 09:00      Nasal Cannula 3.0 


 


11/29/18 08:00 97.5 81 18 103/52 (69) 97   


 


11/29/18 07:38  71      


 


11/29/18 04:00 98.6 72 20 98/41 (60) 100   


 


11/29/18 04:00  69      


 


11/29/18 03:31 98.1       


 


11/29/18 00:00 98.1 71 20 107/46 (66) 99   


 


11/28/18 21:00      Nasal Cannula 3.0 


 


11/28/18 20:00 98.0 82 18 104/46 (65) 96   


 


11/28/18 20:00  76      








Height (Feet):  5


Height (Inches):  5.00


Weight (Pounds):  117


General Appearance:  no acute distress, cachetic


HEENT:  normocephalic, atraumatic, anicteric, mucous membranes moist, PERRL, 

pharynx normal, supple, no JVD


Respiratory/Chest:  chest wall non-tender, no respiratory distress, no 

accessory muscle use, decreased breath sounds, crackles/rales


Cardiovascular:  normal peripheral pulses, normal rate, regular rhythm, no 

gallop/murmur, no JVD


Abdomen:  normal bowel sounds, soft, non tender, no organomegaly, non distended

, no mass, other - red with abrasions


Genitourinary:  normal external genitalia


Extremities:  no cyanosis, no clubbing


Neurologic/Psychiatric:  alert, unresponsiveness


Lymphatic:  no neck adenopathy, no groin adenopathy


Musculoskeletal:  normal muscle bulk, no effusion





Microbiology








 Date/Time


Source Procedure


Growth Status


 


 


 11/27/18 13:45


Blood Blood Culture - Preliminary


NO GROWTH AFTER 24 HOURS Resulted


 


 11/27/18 13:30


Blood Blood Culture - Preliminary


NO GROWTH AFTER 24 HOURS Resulted





 11/28/18 08:45


Sputum Expectorated Gram Stain - Final Resulted


 


 11/28/18 08:45 Sputum Culture - Preliminary


Gram Negative Bacillus 1 Resulted


 


 11/27/18 13:45


Nasal Nares MRSA Culture - Final


NO METHICILLIN RESISTANT STAPH AUREUS... Complete


 


 11/27/18 13:45


Nasal Nares Influenza Types A,B Antigen (JEANNETTE) - Final Complete


 


 11/27/18 20:45


Indwelling Cath Urine Culture - Preliminary


NO GROWTH Resulted





 11/27/18 13:45


Rectum VRE Culture - Final


NO VANCOMYCIN RESISTANT ENTEROCOCCUS ... Complete


 


 11/27/18 13:45


Rectum - Final


NO CARBAPENEM-RESISTANT ENTEROBACTERI... Complete











Laboratory Tests








Test


  11/29/18


07:04


 


White Blood Count


  8.9 K/UL


(4.8-10.8)


 


Red Blood Count


  2.78 M/UL


(4.70-6.10)  L


 


Hemoglobin


  9.1 G/DL


(14.2-18.0)  L


 


Hematocrit


  25.8 %


(42.0-52.0)  L


 


Mean Corpuscular Volume 93 FL (80-99)  


 


Mean Corpuscular Hemoglobin


  32.8 PG


(27.0-31.0)  H


 


Mean Corpuscular Hemoglobin


Concent 35.3 G/DL


(32.0-36.0)


 


Red Cell Distribution Width


  10.2 %


(11.6-14.8)  L


 


Platelet Count


  132 K/UL


(150-450)  L


 


Mean Platelet Volume


  7.9 FL


(6.5-10.1)


 


Neutrophils (%) (Auto)


  % (45.0-75.0)


 


 


Lymphocytes (%) (Auto)


  % (20.0-45.0)


 


 


Monocytes (%) (Auto)  % (1.0-10.0)  


 


Eosinophils (%) (Auto)  % (0.0-3.0)  


 


Basophils (%) (Auto)  % (0.0-2.0)  


 


Differential Total Cells


Counted 100  


 


 


Neutrophils % (Manual) 78 % (45-75)  H


 


Lymphocytes % (Manual) 11 % (20-45)  L


 


Monocytes % (Manual) 10 % (1-10)  


 


Eosinophils % (Manual) 0 % (0-3)  


 


Basophils % (Manual) 0 % (0-2)  


 


Band Neutrophils 1 % (0-8)  


 


Platelet Estimate Decreased  L


 


Platelet Morphology Normal  


 


Sodium Level


  133 MMOL/L


(136-145)  L


 


Potassium Level


  3.6 MMOL/L


(3.5-5.1)


 


Chloride Level


  101 MMOL/L


()


 


Carbon Dioxide Level


  28 MMOL/L


(21-32)


 


Anion Gap


  4 mmol/L


(5-15)  L


 


Blood Urea Nitrogen


  20 mg/dL


(7-18)  H


 


Creatinine


  0.5 MG/DL


(0.55-1.30)  L


 


Estimat Glomerular Filtration


Rate  mL/min (>60)  


 


 


Glucose Level


  107 MG/DL


()  H


 


Calcium Level


  8.0 MG/DL


(8.5-10.1)  L


 


Phosphorus Level


  2.1 MG/DL


(2.5-4.9)  L


 


Magnesium Level


  2.1 MG/DL


(1.8-2.4)


 


Total Bilirubin


  0.7 MG/DL


(0.2-1.0)


 


Aspartate Amino Transf


(AST/SGOT) 78 U/L (15-37)


H


 


Alanine Aminotransferase


(ALT/SGPT) 63 U/L (12-78)


 


 


Alkaline Phosphatase


  84 U/L


()


 


C-Reactive Protein,


Quantitative 5.9 mg/dL


(0.00-0.90)  H


 


Pro-B-Type Natriuretic Peptide


  2924 pg/mL


(0-125)  H


 


Total Protein


  6.0 G/DL


(6.4-8.2)  L


 


Albumin


  2.3 G/DL


(3.4-5.0)  L


 


Globulin 3.7 g/dL  


 


Albumin/Globulin Ratio


  0.6 (1.0-2.7)


L











Current Medications








 Medications


  (Trade)  Dose


 Ordered  Sig/Benita


 Route


 PRN Reason  Start Time


 Stop Time Status Last Admin


Dose Admin


 


 Acetaminophen


  (Tylenol)  650 mg  Q4H  PRN


 GT


 Mild Pain/Temp > 100.5  11/27/18 18:15


 12/27/18 18:14   


 


 


 Heparin Sodium


  (Porcine)


  (Heparin 5000


 units/ml)  5,000 units  EVERY 12  HOURS


 SUBQ


   11/27/18 21:00


 12/27/18 20:59  11/27/18 21:18


 


 


 Iron Sucrose 100


 mg/Sodium Chloride  60 ml @ 


 240 mls/hr  BEDTIME


 IV


   11/29/18 21:00


 12/3/18 21:14   


 


 


 Levothyroxine


 Sodium


  (Synthroid)  25 mcg  DAILY@0630


 GT


   11/29/18 06:30


 12/29/18 06:29  11/29/18 06:05


 


 


 Metoclopramide HCl


  (Reglan)  10 mg  Q6H  PRN


 IVP


 Nausea & Vomiting  11/27/18 18:15


 12/27/18 18:14   


 


 


 Pantoprazole


  (Protonix)  40 mg  EVERY 12  HOURS


 IVP


   11/27/18 21:00


 12/27/18 20:59  11/29/18 08:48


 


 


 Piperacillin Sod/


 Tazobactam Sod


 3.375 gm/Dextrose  110 ml @ 


 27.5 mls/hr  EVERY 8  HOURS


 IVPB


   11/27/18 22:00


 12/2/18 21:59  11/29/18 14:25


 


 


 Tramadol HCl


  (Ultram)  25 mg  Q8H


 GT


   11/27/18 18:30


 12/4/18 18:29  11/29/18 10:23


 


 


 Vancomycin HCl


  (Vanco rx to


 dose)  1 ea  DAILY  PRN


 MISC


 Per rx protocol  11/27/18 18:45


 12/27/18 18:44   


 


 


 Vancomycin/Sodium


 Chloride  250 ml @ 


 166.667


 mls/hr  Q24H


 IVPB


   11/28/18 17:00


 12/3/18 16:59  11/28/18 17:09


 

















Ricky Espinal M.D. Nov 29, 2018 16:29

## 2018-11-29 NOTE — GENERAL PROGRESS NOTE
Assessment/Plan


Problem List:  


(1) Aspiration pneumonia


ICD Codes:  J69.0 - Pneumonitis due to inhalation of food and vomit


SNOMED:  145556715


(2) Hyponatremia


ICD Codes:  E87.1 - Hypo-osmolality and hyponatremia


SNOMED:  47264619


(3) Hypoalbuminemia


ICD Codes:  E88.09 - Other disorders of plasma-protein metabolism, not 

elsewhere classified


SNOMED:  145222292


(4) Anemia


ICD Codes:  D64.9 - Anemia, unspecified


SNOMED:  324764625


Status:  stable


Assessment/Plan





replace mag phos K as needed


Antibiotics


pulm toilet


monitor labs lytes


per ID and pulm advise





Subjective


ROS Limited/Unobtainable:  No


Constitutional:  Reports: other - non verbal


Allergies:  


Coded Allergies:  


     NO KNOWN DRUG ALLERGIES (Unverified  Allergy, Unknown, 1/28/16)





Objective





Last 24 Hour Vital Signs








  Date Time  Temp Pulse Resp B/P (MAP) Pulse Ox O2 Delivery O2 Flow Rate FiO2


 


11/29/18 12:00 97.9 50 17 126/83 (97) 98   


 


11/29/18 11:46  71      


 


11/29/18 09:00      Nasal Cannula 3.0 


 


11/29/18 08:00 97.5 81 18 103/52 (69) 97   


 


11/29/18 07:38  71      


 


11/29/18 04:00 98.6 72 20 98/41 (60) 100   


 


11/29/18 04:00  69      


 


11/29/18 03:31 98.1       


 


11/29/18 00:00 98.1 71 20 107/46 (66) 99   


 


11/28/18 21:00      Nasal Cannula 3.0 


 


11/28/18 20:00 98.0 82 18 104/46 (65) 96   


 


11/28/18 20:00  76      


 


11/28/18 16:00 98.1 80 19 108/53 (71) 94   


 


11/28/18 15:48  80      

















Intake and Output  


 


 11/28/18 11/29/18





 18:59 06:59


 


Intake Total 730 ml 40 ml


 


Output Total 800 ml 400 ml


 


Balance -70 ml -360 ml


 


  


 


Intake Oral 300 ml 


 


Free Water 100 ml 


 


Tube Feeding 330 ml 40 ml


 


Output Urine Total 800 ml 400 ml


 


# Bowel Movements 1 








Laboratory Tests


11/29/18 07:04: 


White Blood Count 8.9, Red Blood Count 2.78L, Hemoglobin 9.1L, Hematocrit 25.8L

, Mean Corpuscular Volume 93, Mean Corpuscular Hemoglobin 32.8H, Mean 

Corpuscular Hemoglobin Concent 35.3, Red Cell Distribution Width 10.2L, 

Platelet Count 132L, Mean Platelet Volume 7.9, Neutrophils (%) (Auto) , 

Lymphocytes (%) (Auto) , Monocytes (%) (Auto) , Eosinophils (%) (Auto) , 

Basophils (%) (Auto) , Differential Total Cells Counted 100, Neutrophils % (

Manual) 78H, Lymphocytes % (Manual) 11L, Monocytes % (Manual) 10, Eosinophils % 

(Manual) 0, Basophils % (Manual) 0, Band Neutrophils 1, Platelet Estimate 

DecreasedL, Platelet Morphology Normal, Sodium Level 133L, Potassium Level 3.6, 

Chloride Level 101, Carbon Dioxide Level 28, Anion Gap 4L, Blood Urea Nitrogen 

20H, Creatinine 0.5L, Estimat Glomerular Filtration Rate , Glucose Level 107H, 

Calcium Level 8.0L, Phosphorus Level 2.1L, Magnesium Level 2.1, Total Bilirubin 

0.7, Aspartate Amino Transf (AST/SGOT) 78H, Alanine Aminotransferase (ALT/SGPT) 

63, Alkaline Phosphatase 84, C-Reactive Protein, Quantitative 5.9H, Pro-B-Type 

Natriuretic Peptide 2924H, Total Protein 6.0L, Albumin 2.3L, Globulin 3.7, 

Albumin/Globulin Ratio 0.6L


Height (Feet):  5


Height (Inches):  5.00


Weight (Pounds):  117


General Appearance:  no apparent distress, lethargic


Cardiovascular:  normal rate


Abdomen:  soft, other - PEG


Objective


no other changes











Morales Hlil MD Nov 29, 2018 15:02

## 2018-11-30 VITALS — DIASTOLIC BLOOD PRESSURE: 61 MMHG | SYSTOLIC BLOOD PRESSURE: 127 MMHG

## 2018-11-30 VITALS — SYSTOLIC BLOOD PRESSURE: 142 MMHG | DIASTOLIC BLOOD PRESSURE: 78 MMHG

## 2018-11-30 VITALS — DIASTOLIC BLOOD PRESSURE: 57 MMHG | SYSTOLIC BLOOD PRESSURE: 122 MMHG

## 2018-11-30 VITALS — DIASTOLIC BLOOD PRESSURE: 47 MMHG | SYSTOLIC BLOOD PRESSURE: 113 MMHG

## 2018-11-30 VITALS — SYSTOLIC BLOOD PRESSURE: 120 MMHG | DIASTOLIC BLOOD PRESSURE: 68 MMHG

## 2018-11-30 VITALS — SYSTOLIC BLOOD PRESSURE: 109 MMHG | DIASTOLIC BLOOD PRESSURE: 76 MMHG

## 2018-11-30 LAB
ADD MANUAL DIFF: NO
ALBUMIN SERPL-MCNC: 2.2 G/DL (ref 3.4–5)
ALBUMIN/GLOB SERPL: 0.6 {RATIO} (ref 1–2.7)
ALP SERPL-CCNC: 119 U/L (ref 46–116)
ALT SERPL-CCNC: 74 U/L (ref 12–78)
ANION GAP SERPL CALC-SCNC: 6 MMOL/L (ref 5–15)
AST SERPL-CCNC: 84 U/L (ref 15–37)
BASOPHILS NFR BLD AUTO: 0.2 % (ref 0–2)
BILIRUB SERPL-MCNC: 0.7 MG/DL (ref 0.2–1)
BUN SERPL-MCNC: 25 MG/DL (ref 7–18)
CALCIUM SERPL-MCNC: 8 MG/DL (ref 8.5–10.1)
CHLORIDE SERPL-SCNC: 101 MMOL/L (ref 98–107)
CO2 SERPL-SCNC: 28 MMOL/L (ref 21–32)
CREAT SERPL-MCNC: 0.5 MG/DL (ref 0.55–1.3)
EOSINOPHIL NFR BLD AUTO: 1.5 % (ref 0–3)
ERYTHROCYTE [DISTWIDTH] IN BLOOD BY AUTOMATED COUNT: 10.6 % (ref 11.6–14.8)
GLOBULIN SER-MCNC: 3.8 G/DL
HCT VFR BLD CALC: 26.4 % (ref 42–52)
HGB BLD-MCNC: 9.1 G/DL (ref 14.2–18)
LYMPHOCYTES NFR BLD AUTO: 13.8 % (ref 20–45)
MCV RBC AUTO: 93 FL (ref 80–99)
MONOCYTES NFR BLD AUTO: 10.4 % (ref 1–10)
NEUTROPHILS NFR BLD AUTO: 74.1 % (ref 45–75)
PHOSPHATE SERPL-MCNC: 1.9 MG/DL (ref 2.5–4.9)
PLATELET # BLD: 159 K/UL (ref 150–450)
POTASSIUM SERPL-SCNC: 4 MMOL/L (ref 3.5–5.1)
RBC # BLD AUTO: 2.83 M/UL (ref 4.7–6.1)
SODIUM SERPL-SCNC: 135 MMOL/L (ref 136–145)
WBC # BLD AUTO: 7.2 K/UL (ref 4.8–10.8)

## 2018-11-30 RX ADMIN — LEVOTHYROXINE SODIUM SCH MCG: 25 TABLET ORAL at 06:38

## 2018-11-30 RX ADMIN — HEPARIN SODIUM SCH UNITS: 5000 INJECTION INTRAVENOUS; SUBCUTANEOUS at 20:16

## 2018-11-30 RX ADMIN — PANTOPRAZOLE SODIUM SCH MG: 40 INJECTION, POWDER, FOR SOLUTION INTRAVENOUS at 20:15

## 2018-11-30 RX ADMIN — SODIUM CHLORIDE SCH MLS/HR: 0.9 INJECTION INTRAVENOUS at 20:15

## 2018-11-30 RX ADMIN — TRAMADOL HYDROCHLORIDE SCH MG: 50 TABLET, FILM COATED ORAL at 09:38

## 2018-11-30 RX ADMIN — TRAMADOL HYDROCHLORIDE SCH MG: 50 TABLET, FILM COATED ORAL at 18:33

## 2018-11-30 RX ADMIN — PANTOPRAZOLE SODIUM SCH MG: 40 INJECTION, POWDER, FOR SOLUTION INTRAVENOUS at 09:11

## 2018-11-30 RX ADMIN — TRAMADOL HYDROCHLORIDE SCH MG: 50 TABLET, FILM COATED ORAL at 02:57

## 2018-11-30 RX ADMIN — DEXTROSE MONOHYDRATE SCH MLS/HR: 50 INJECTION, SOLUTION INTRAVENOUS at 06:00

## 2018-11-30 RX ADMIN — ERTAPENEM SODIUM SCH MLS/HR: 1 INJECTION, POWDER, LYOPHILIZED, FOR SOLUTION INTRAMUSCULAR; INTRAVENOUS at 11:48

## 2018-11-30 RX ADMIN — HEPARIN SODIUM SCH UNITS: 5000 INJECTION INTRAVENOUS; SUBCUTANEOUS at 09:12

## 2018-11-30 NOTE — PULMONOLOGY PROGRESS NOTE
Assessment/Plan


Problems:  


(1) Aspiration pneumonia


(2) Sepsis


(3) CAD (coronary artery disease)


(4) Severe protein-calorie malnutrition


(5) Anemia


(6) S/P percutaneous endoscopic gastrostomy (PEG) tube placement


(7) Dementia


Assessment/Plan


opens eyes, looks awake


improving


opening eyes


check cultures


f/u wbc 


tolerating feeding


med/surg





Subjective


ROS Limited/Unobtainable:  Yes


Constitutional:  Reports: no symptoms


HEENT:  Repors: no symptoms


Respiratory:  Reports: no symptoms


Allergies:  


Coded Allergies:  


     NO KNOWN DRUG ALLERGIES (Unverified  Allergy, Unknown, 1/28/16)





Objective





Last 24 Hour Vital Signs








  Date Time  Temp Pulse Resp B/P (MAP) Pulse Ox O2 Delivery O2 Flow Rate FiO2


 


11/30/18 12:00 97.0 60 19 142/78 (99) 97   


 


11/30/18 09:00      Nasal Cannula 3.0 


 


11/30/18 08:00 97.0 79 17 127/61 (83) 99   


 


11/30/18 04:00 96.8 82 20 122/57 (78) 98   


 


11/30/18 03:27 97.3       


 


11/30/18 00:00 97.7 82 20 113/47 (69) 98   


 


11/29/18 21:00      Nasal Cannula 3.0 


 


11/29/18 20:42 97.3 80 20 116/57 (76) 96   


 


11/29/18 16:00 98.0 69 20 109/61 (77) 98   

















Intake and Output  


 


 11/29/18 11/30/18





 19:00 07:00


 


Intake Total 460 ml 777.5 ml


 


Output Total 300 ml 300 ml


 


Balance 160 ml 477.5 ml


 


  


 


Free Water 100 ml 180 ml


 


IV Total  197.5 ml


 


Tube Feeding 360 ml 400 ml


 


Output Urine Total 300 ml 300 ml








General Appearance:  cachetic


HEENT:  normocephalic, atraumatic


Respiratory/Chest:  chest wall non-tender, lungs clear


Cardiovascular:  normal peripheral pulses, normal rate


Abdomen:  normal bowel sounds, soft, non tender


Genitourinary:  normal external genitalia





Microbiology








 Date/Time


Source Procedure


Growth Status


 


 


 11/27/18 13:45


Blood Blood Culture - Preliminary


NO GROWTH AFTER 48 HOURS Resulted


 


 11/27/18 13:30


Blood Blood Culture - Preliminary


NO GROWTH AFTER 48 HOURS Resulted





 11/28/18 08:45


Sputum Expectorated Gram Stain - Final Complete


 


 11/28/18 08:45 Sputum Culture - Final


Escherichia Coli - Esbl


Usual Respiratory Shyla Complete


 


 11/27/18 13:45


Nasal Nares MRSA Culture - Final


NO METHICILLIN RESISTANT STAPH AUREUS... Complete


 


 11/27/18 13:45


Nasal Nares Influenza Types A,B Antigen (JEANNETTE) - Final Complete


 


 11/27/18 20:45


Indwelling Cath Urine Culture - Preliminary


NO GROWTH AFTER 24 HOURS Resulted





 11/27/18 13:45


Rectum VRE Culture - Final


NO VANCOMYCIN RESISTANT ENTEROCOCCUS ... Complete


 


 11/27/18 13:45


Rectum - Final


NO CARBAPENEM-RESISTANT ENTEROBACTERI... Complete








Laboratory Tests


11/30/18 06:55: 


White Blood Count 7.2, Red Blood Count 2.83L, Hemoglobin 9.1L, Hematocrit 26.4L

, Mean Corpuscular Volume 93, Mean Corpuscular Hemoglobin 32.3H, Mean 

Corpuscular Hemoglobin Concent 34.7, Red Cell Distribution Width 10.6L, 

Platelet Count 159, Mean Platelet Volume 7.6, Neutrophils (%) (Auto) 74.1, 

Lymphocytes (%) (Auto) 13.8L, Monocytes (%) (Auto) 10.4H, Eosinophils (%) (Auto

) 1.5, Basophils (%) (Auto) 0.2, Sodium Level 135L, Potassium Level 4.0, 

Chloride Level 101, Carbon Dioxide Level 28, Anion Gap 6, Blood Urea Nitrogen 

25H, Creatinine 0.5L, Estimat Glomerular Filtration Rate , Glucose Level 118H, 

Calcium Level 8.0L, Phosphorus Level 1.9L, Magnesium Level 2.1, Total Bilirubin 

0.7, Aspartate Amino Transf (AST/SGOT) 84H, Alanine Aminotransferase (ALT/SGPT) 

74, Alkaline Phosphatase 119H, Total Protein 6.0L, Albumin 2.2L, Globulin 3.8, 

Albumin/Globulin Ratio 0.6L





Current Medications








 Medications


  (Trade)  Dose


 Ordered  Sig/Benita


 Route


 PRN Reason  Start Time


 Stop Time Status Last Admin


Dose Admin


 


 Acetaminophen


  (Tylenol)  650 mg  Q4H  PRN


 GT


 Mild Pain/Temp > 100.5  11/29/18 21:30


 12/27/18 21:29   


 


 


 Ertapenem 1 gm/


 Sodium Chloride  55 ml @ 


 110 mls/hr  Q24H


 IVPB


   11/30/18 12:00


 12/5/18 11:59  11/30/18 11:48


 


 


 Heparin Sodium


  (Porcine)


  (Heparin 5000


 units/ml)  5,000 units  EVERY 12  HOURS


 SUBQ


   11/29/18 21:00


 12/27/18 20:59  11/30/18 09:12


 


 


 Iron Sucrose 100


 mg/Sodium Chloride  60 ml @ 


 240 mls/hr  BEDTIME


 IV


   11/29/18 21:00


 12/3/18 21:14  11/29/18 21:00


 


 


 Levothyroxine


 Sodium


  (Synthroid)  25 mcg  DAILY@0630


 GT


   11/30/18 06:30


 12/29/18 06:29  11/30/18 06:38


 


 


 Metoclopramide HCl


  (Reglan)  10 mg  Q6H  PRN


 IVP


 Nausea & Vomiting  11/29/18 21:30


 12/29/18 21:29   


 


 


 Pantoprazole


  (Protonix)  40 mg  EVERY 12  HOURS


 IVP


   11/29/18 21:00


 12/27/18 20:59  11/30/18 09:11


 


 


 Sodium Phosphate


 30 mm/Sodium


 Chloride  285 ml @ 


 47.5 mls/hr  ONCE  ONCE


 IVPB


   11/30/18 12:30


 11/30/18 18:29   


 


 


 Tramadol HCl


  (Ultram)  25 mg  Q8H


 GT


   11/30/18 02:30


 12/4/18 18:29  11/30/18 09:38


 

















Lina Parker MD Nov 30, 2018 12:51

## 2018-11-30 NOTE — CONSULTATION
History of Present Illness


General


Date patient seen:  Nov 30, 2018


Chief Complaint:  Fever





Present Illness


HPI


84 year old male with multiple medical comorbidities who is a nursing home 

resident presented for fever and cough for evaluation.  Upon admission noted to 

have multiple wounds which will require care and management. Surgery called to 

evaluate. patient seen, chart reviewed, patient examined.  resting comfortable 

in hospital bed


Allergies:  


Coded Allergies:  


     NO KNOWN DRUG ALLERGIES (Unverified  Allergy, Unknown, 1/28/16)





Medication History


Scheduled


Ranitidine Hcl* (Zantac*), 150 MG ORAL TWICE A DAY





Scheduled PRN


Acetaminophen 160MG/5ML* (Acetaminophen*), 20 ML ORAL Q6HR PRN for For Pain, (

Reported)


Morphine Sulfate (Morphine Sulfate), 5 MG SL Q2HR PRN for Severe Pain (Pain 

Scale 7-10), (Reported)


Morphine Sulfate (Morphine Sulfate), 5 MG SL Q2HR PRN for Shortness of Breath, (

Reported)





Discontinued Medications


Amoxicillin/Potassium Clav 500-125 Tablet* (Augmentin 500-125 Tablet*), 1 TAB 

ORAL THREE TIMES A DAY


   Discontinued Reason: Therapy completed


Bacitracin (Bacitracin*), 1 PACKET TOPIC, (Reported)


   Discontinued Reason: Therapy completed





Patient History


Limited by:  medical condition


History Provided By:  Medical Record, PMD


Healthcare decision maker


N


Resuscitation status


Do Not Resuscitate


Advanced Directive on File


Yes





Past Medical/Surgical History


Past Medical/Surgical History:  


(1) Severe protein-calorie malnutrition


(2) Abdominal wall cellulitis


(3) Fever


(4) Pneumonia


(5) Elevated liver enzymes


(6) Gastrointestinal hemorrhage


(7) UTI (urinary tract infection)


(8) CAD (coronary artery disease)


(9) Dementia


(10) HTN (hypertension)


(11) Hematuria


(12) Malfunction of gastrostomy tube


(13) Sepsis


(14) Aspiration pneumonia


(15) Anemia


(16) Hyponatremia


(17) Hypoalbuminemia





Review of Systems


ROS Narrative


cannot obtain given medical condition





Physical Exam


General Appearance:  no apparent distress


Lines, tubes and drains:  other


HEENT:  atraumatic, mucous membranes moist


Neck:  normal inspection


Respiratory/Chest:  lungs clear, normal breath sounds, no respiratory distress


Cardiovascular/Chest:  normal rate


Abdomen:  normal bowel sounds, non tender, soft, no organomegaly


Extremities:  other


Skin Exam:  warm/dry, other


Neurologic:  unresponsiveness





Last 24 Hour Vital Signs








  Date Time  Temp Pulse Resp B/P (MAP) Pulse Ox O2 Delivery O2 Flow Rate FiO2


 


11/30/18 16:00 98.4 84 20 120/68 (85) 97   


 


11/30/18 12:00 97.0 60 19 142/78 (99) 97   


 


11/30/18 09:00      Nasal Cannula 3.0 


 


11/30/18 08:00 97.0 79 17 127/61 (83) 99   


 


11/30/18 04:00 96.8 82 20 122/57 (78) 98   


 


11/30/18 03:27 97.3       


 


11/30/18 00:00 97.7 82 20 113/47 (69) 98   


 


11/29/18 21:00      Nasal Cannula 3.0 


 


11/29/18 20:42 97.3 80 20 116/57 (76) 96   

















Intake and Output  


 


 11/29/18 11/30/18





 18:59 06:59


 


Intake Total 460 ml 750.0 ml


 


Output Total 300 ml 300 ml


 


Balance 160 ml 450.0 ml


 


  


 


Free Water 100 ml 180 ml


 


IV Total  170.0 ml


 


Tube Feeding 360 ml 400 ml


 


Output Urine Total 300 ml 300 ml











Laboratory Tests








Test


  11/30/18


06:55


 


White Blood Count


  7.2 K/UL


(4.8-10.8)


 


Red Blood Count


  2.83 M/UL


(4.70-6.10)  L


 


Hemoglobin


  9.1 G/DL


(14.2-18.0)  L


 


Hematocrit


  26.4 %


(42.0-52.0)  L


 


Mean Corpuscular Volume 93 FL (80-99)  


 


Mean Corpuscular Hemoglobin


  32.3 PG


(27.0-31.0)  H


 


Mean Corpuscular Hemoglobin


Concent 34.7 G/DL


(32.0-36.0)


 


Red Cell Distribution Width


  10.6 %


(11.6-14.8)  L


 


Platelet Count


  159 K/UL


(150-450)


 


Mean Platelet Volume


  7.6 FL


(6.5-10.1)


 


Neutrophils (%) (Auto)


  74.1 %


(45.0-75.0)


 


Lymphocytes (%) (Auto)


  13.8 %


(20.0-45.0)  L


 


Monocytes (%) (Auto)


  10.4 %


(1.0-10.0)  H


 


Eosinophils (%) (Auto)


  1.5 %


(0.0-3.0)


 


Basophils (%) (Auto)


  0.2 %


(0.0-2.0)


 


Sodium Level


  135 MMOL/L


(136-145)  L


 


Potassium Level


  4.0 MMOL/L


(3.5-5.1)


 


Chloride Level


  101 MMOL/L


()


 


Carbon Dioxide Level


  28 MMOL/L


(21-32)


 


Anion Gap


  6 mmol/L


(5-15)


 


Blood Urea Nitrogen


  25 mg/dL


(7-18)  H


 


Creatinine


  0.5 MG/DL


(0.55-1.30)  L


 


Estimat Glomerular Filtration


Rate  mL/min (>60)  


 


 


Glucose Level


  118 MG/DL


()  H


 


Calcium Level


  8.0 MG/DL


(8.5-10.1)  L


 


Phosphorus Level


  1.9 MG/DL


(2.5-4.9)  L


 


Magnesium Level


  2.1 MG/DL


(1.8-2.4)


 


Total Bilirubin


  0.7 MG/DL


(0.2-1.0)


 


Aspartate Amino Transf


(AST/SGOT) 84 U/L (15-37)


H


 


Alanine Aminotransferase


(ALT/SGPT) 74 U/L (12-78)


 


 


Alkaline Phosphatase


  119 U/L


()  H


 


Total Protein


  6.0 G/DL


(6.4-8.2)  L


 


Albumin


  2.2 G/DL


(3.4-5.0)  L


 


Globulin 3.8 g/dL  


 


Albumin/Globulin Ratio


  0.6 (1.0-2.7)


L








Height (Feet):  5


Height (Inches):  5.00


Weight (Pounds):  117


Medications





Current Medications








 Medications


  (Trade)  Dose


 Ordered  Sig/Benita


 Route


 PRN Reason  Start Time


 Stop Time Status Last Admin


Dose Admin


 


 Acetaminophen


  (Tylenol)  650 mg  Q4H  PRN


 GT


 Mild Pain/Temp > 100.5  11/29/18 21:30


 12/27/18 21:29   


 


 


 Ertapenem 1 gm/


 Sodium Chloride  55 ml @ 


 110 mls/hr  Q24H


 IVPB


   11/30/18 12:00


 12/5/18 11:59  11/30/18 11:48


 


 


 Heparin Sodium


  (Porcine)


  (Heparin 5000


 units/ml)  5,000 units  EVERY 12  HOURS


 SUBQ


   11/29/18 21:00


 12/27/18 20:59  11/30/18 09:12


 


 


 Iron Sucrose 100


 mg/Sodium Chloride  60 ml @ 


 240 mls/hr  BEDTIME


 IV


   11/29/18 21:00


 12/3/18 21:14  11/29/18 21:00


 


 


 Levothyroxine


 Sodium


  (Synthroid)  25 mcg  DAILY@0630


 GT


   11/30/18 06:30


 12/29/18 06:29  11/30/18 06:38


 


 


 Metoclopramide HCl


  (Reglan)  10 mg  Q6H  PRN


 IVP


 Nausea & Vomiting  11/29/18 21:30


 12/29/18 21:29   


 


 


 Pantoprazole


  (Protonix)  40 mg  EVERY 12  HOURS


 IVP


   11/29/18 21:00


 12/27/18 20:59  11/30/18 09:11


 


 


 Sodium Phosphate


 30 mm/Sodium


 Chloride  285 ml @ 


 47.5 mls/hr  ONCE  ONCE


 IVPB


   11/30/18 12:30


 11/30/18 18:29  11/30/18 13:38


 


 


 Tramadol HCl


  (Ultram)  25 mg  Q8H


 GT


   11/30/18 02:30


 12/4/18 18:29  11/30/18 09:38


 











Assessment/Plan


Problem List:  


(1) Severe protein-calorie malnutrition


Assessment & Plan:  Will need nutritional optimization for proper wound healing


will check labs 


nutrition consult


increase intake


thank you


ICD Codes:  E43 - Unspecified severe protein-calorie malnutrition


SNOMED:  895885594


(2) Decubitus ulcer


Assessment & Plan:  Patient present with multiple pressure injuries. 


Maroon discoloration noted to L shoulder (L)3cm x (W)5.5cm 


Full thickness pressure injury to thoracic spine with 100% slough and red 

borders(L)0.5cm x (W)0.7cm .Non-blanching erythema periwound. 


Full thickness pressure injury to sacrum with 50% slough (L)2cm x (W)

1.5cm.Marginal erythema at borders .Non-blanching erythema periwound. 


Scrotum red but is intact. 


Resolving pressure injury noted to medial L tibia. 


Resolving pressure injury noted to dorsum R foot , pink and dry.  


R 1st metatarsal head,lateral and plantar aspect fluctuant and maroon in color. 


R hallux maroon and fluctuant. 


Both heels are boggy but blanchable.


All wounds present upon admission and will be cared for during hospital stay 





Tx.Plan:


Cleanse wounds sacrum and Thoracic area with Saline.Apply Therahoney gel. Cover 

with Optifoam drsg Daily and and prn.


           


Apply Cavilon to L shoulder.Cover with Optifoam drsg .Change Daily and prn.


           


Apply Cavilon wipes to R 1st metatarsal and R hallux.Cover with Optifoam drsg 

.Change every 7 days and prn.


           


Cavilon to both heels .Cover with Optifoam drsg .Change every 7days and prn


           


Reposition at minimum every 2hours or as tolerated.


           


Surface Support mattress.


ICD Codes:  L89.90 - Pressure ulcer of unspecified site, unspecified stage


SNOMED:  [id number]











Carl Muñoz Nov 30, 2018 17:23

## 2018-11-30 NOTE — GENERAL PROGRESS NOTE
Assessment/Plan


Problem List:  


(1) Aspiration pneumonia


ICD Codes:  J69.0 - Pneumonitis due to inhalation of food and vomit


SNOMED:  015229401


(2) Hyponatremia


ICD Codes:  E87.1 - Hypo-osmolality and hyponatremia


SNOMED:  28297542


(3) Hypoalbuminemia


ICD Codes:  E88.09 - Other disorders of plasma-protein metabolism, not 

elsewhere classified


SNOMED:  064659062


(4) Anemia


ICD Codes:  D64.9 - Anemia, unspecified


SNOMED:  501827119


Status:  stable


Assessment/Plan





replace mag phos K as needed


Antibiotics  IVANZ


pulm toilet


monitor labs lytes


per ID and pulm advise


? DC on bactrim in am?





Subjective


ROS Limited/Unobtainable:  No


Allergies:  


Coded Allergies:  


     NO KNOWN DRUG ALLERGIES (Unverified  Allergy, Unknown, 1/28/16)





Objective





Last 24 Hour Vital Signs








  Date Time  Temp Pulse Resp B/P (MAP) Pulse Ox O2 Delivery O2 Flow Rate FiO2


 


11/30/18 12:00 97.0 60 19 142/78 (99) 97   


 


11/30/18 09:00      Nasal Cannula 3.0 


 


11/30/18 08:00 97.0 79 17 127/61 (83) 99   


 


11/30/18 04:00 96.8 82 20 122/57 (78) 98   


 


11/30/18 03:27 97.3       


 


11/30/18 00:00 97.7 82 20 113/47 (69) 98   


 


11/29/18 21:00      Nasal Cannula 3.0 


 


11/29/18 20:42 97.3 80 20 116/57 (76) 96   


 


11/29/18 16:00 98.0 69 20 109/61 (77) 98   

















Intake and Output  


 


 11/29/18 11/30/18





 18:59 06:59


 


Intake Total 460 ml 750.0 ml


 


Output Total 300 ml 300 ml


 


Balance 160 ml 450.0 ml


 


  


 


Free Water 100 ml 180 ml


 


IV Total  170.0 ml


 


Tube Feeding 360 ml 400 ml


 


Output Urine Total 300 ml 300 ml








Laboratory Tests


11/30/18 06:55: 


White Blood Count 7.2, Red Blood Count 2.83L, Hemoglobin 9.1L, Hematocrit 26.4L

, Mean Corpuscular Volume 93, Mean Corpuscular Hemoglobin 32.3H, Mean 

Corpuscular Hemoglobin Concent 34.7, Red Cell Distribution Width 10.6L, 

Platelet Count 159, Mean Platelet Volume 7.6, Neutrophils (%) (Auto) 74.1, 

Lymphocytes (%) (Auto) 13.8L, Monocytes (%) (Auto) 10.4H, Eosinophils (%) (Auto

) 1.5, Basophils (%) (Auto) 0.2, Sodium Level 135L, Potassium Level 4.0, 

Chloride Level 101, Carbon Dioxide Level 28, Anion Gap 6, Blood Urea Nitrogen 

25H, Creatinine 0.5L, Estimat Glomerular Filtration Rate , Glucose Level 118H, 

Calcium Level 8.0L, Phosphorus Level 1.9L, Magnesium Level 2.1, Total Bilirubin 

0.7, Aspartate Amino Transf (AST/SGOT) 84H, Alanine Aminotransferase (ALT/SGPT) 

74, Alkaline Phosphatase 119H, Total Protein 6.0L, Albumin 2.2L, Globulin 3.8, 

Albumin/Globulin Ratio 0.6L


Height (Feet):  5


Height (Inches):  5.00


Weight (Pounds):  117


General Appearance:  no apparent distress


Objective


no other changes











Morales Hill MD Nov 30, 2018 14:54

## 2018-11-30 NOTE — INFECTIOUS DISEASES PROG NOTE
Assessment/Plan


Problems:  


(1) Aspiration pneumonia


Assessment & Plan:  due to ESBL producing E.coli , with left perihilar 

infiltrates , will switch zosyn empirically to ertapenem and treat for two 

weeks. EOT 12/14/18


recommend aspiration precaution and keep HOB > 30 degree all the time 





(2) Sepsis


Assessment & Plan:  due to the above, blood culture is negative . 





(3) Abdominal wall cellulitis


Assessment & Plan:  improving, already on wide spectrum antibiotics , continue 

local wound care .





(4) Fever


Assessment & Plan:  due to the above , improving, continue tylenol and wide 

spectrum antibiotics 








Subjective


ROS Limited/Unobtainable:  Yes


Allergies:  


Coded Allergies:  


     NO KNOWN DRUG ALLERGIES (Unverified  Allergy, Unknown, 1/28/16)


Subjective


he was comfortable, lying in bed, contracted, open eyes spontaneously . 

afebrile , no SOB, no wheezing





Objective


Vital Signs





Last 24 Hour Vital Signs








  Date Time  Temp Pulse Resp B/P (MAP) Pulse Ox O2 Delivery O2 Flow Rate FiO2


 


11/30/18 12:00 97.0 60 19 142/78 (99) 97   


 


11/30/18 09:00      Nasal Cannula 3.0 


 


11/30/18 08:00 97.0 79 17 127/61 (83) 99   


 


11/30/18 04:00 96.8 82 20 122/57 (78) 98   


 


11/30/18 03:27 97.3       


 


11/30/18 00:00 97.7 82 20 113/47 (69) 98   


 


11/29/18 21:00      Nasal Cannula 3.0 


 


11/29/18 20:42 97.3 80 20 116/57 (76) 96   


 


11/29/18 16:00 98.0 69 20 109/61 (77) 98   








Height (Feet):  5


Height (Inches):  5.00


Weight (Pounds):  117


General Appearance:  WD/WN, cachetic


HEENT:  normocephalic, atraumatic, anicteric, mucous membranes moist, PERRL


Respiratory/Chest:  chest wall non-tender, lungs clear, normal breath sounds, 

no respiratory distress, no accessory muscle use


Cardiovascular:  normal peripheral pulses, normal rate, regular rhythm, no 

gallop/murmur, no JVD


Abdomen:  normal bowel sounds, soft, non tender, no organomegaly, non distended

, no mass, no scars


Genitourinary:  normal external genitalia


Extremities:  no cyanosis, no clubbing


Skin:  no rash, no lesions, ulcers


Neurologic/Psychiatric:  alert, unresponsiveness


Lymphatic:  no neck adenopathy, no groin adenopathy


Musculoskeletal:  normal muscle bulk, no effusion





Microbiology








 Date/Time


Source Procedure


Growth Status


 


 


 11/28/18 08:45


Sputum Expectorated Gram Stain - Final Complete


 


 11/28/18 08:45 Sputum Culture - Final


Escherichia Coli - Esbl


Usual Respiratory Shyla Complete


 


 11/27/18 20:45


Indwelling Cath Urine Culture - Preliminary


NO GROWTH AFTER 24 HOURS Resulted











Laboratory Tests








Test


  11/30/18


06:55


 


White Blood Count


  7.2 K/UL


(4.8-10.8)


 


Red Blood Count


  2.83 M/UL


(4.70-6.10)  L


 


Hemoglobin


  9.1 G/DL


(14.2-18.0)  L


 


Hematocrit


  26.4 %


(42.0-52.0)  L


 


Mean Corpuscular Volume 93 FL (80-99)  


 


Mean Corpuscular Hemoglobin


  32.3 PG


(27.0-31.0)  H


 


Mean Corpuscular Hemoglobin


Concent 34.7 G/DL


(32.0-36.0)


 


Red Cell Distribution Width


  10.6 %


(11.6-14.8)  L


 


Platelet Count


  159 K/UL


(150-450)


 


Mean Platelet Volume


  7.6 FL


(6.5-10.1)


 


Neutrophils (%) (Auto)


  74.1 %


(45.0-75.0)


 


Lymphocytes (%) (Auto)


  13.8 %


(20.0-45.0)  L


 


Monocytes (%) (Auto)


  10.4 %


(1.0-10.0)  H


 


Eosinophils (%) (Auto)


  1.5 %


(0.0-3.0)


 


Basophils (%) (Auto)


  0.2 %


(0.0-2.0)


 


Sodium Level


  135 MMOL/L


(136-145)  L


 


Potassium Level


  4.0 MMOL/L


(3.5-5.1)


 


Chloride Level


  101 MMOL/L


()


 


Carbon Dioxide Level


  28 MMOL/L


(21-32)


 


Anion Gap


  6 mmol/L


(5-15)


 


Blood Urea Nitrogen


  25 mg/dL


(7-18)  H


 


Creatinine


  0.5 MG/DL


(0.55-1.30)  L


 


Estimat Glomerular Filtration


Rate  mL/min (>60)  


 


 


Glucose Level


  118 MG/DL


()  H


 


Calcium Level


  8.0 MG/DL


(8.5-10.1)  L


 


Phosphorus Level


  1.9 MG/DL


(2.5-4.9)  L


 


Magnesium Level


  2.1 MG/DL


(1.8-2.4)


 


Total Bilirubin


  0.7 MG/DL


(0.2-1.0)


 


Aspartate Amino Transf


(AST/SGOT) 84 U/L (15-37)


H


 


Alanine Aminotransferase


(ALT/SGPT) 74 U/L (12-78)


 


 


Alkaline Phosphatase


  119 U/L


()  H


 


Total Protein


  6.0 G/DL


(6.4-8.2)  L


 


Albumin


  2.2 G/DL


(3.4-5.0)  L


 


Globulin 3.8 g/dL  


 


Albumin/Globulin Ratio


  0.6 (1.0-2.7)


L











Current Medications








 Medications


  (Trade)  Dose


 Ordered  Sig/Benita


 Route


 PRN Reason  Start Time


 Stop Time Status Last Admin


Dose Admin


 


 Acetaminophen


  (Tylenol)  650 mg  Q4H  PRN


 GT


 Mild Pain/Temp > 100.5  11/29/18 21:30


 12/27/18 21:29   


 


 


 Ertapenem 1 gm/


 Sodium Chloride  55 ml @ 


 110 mls/hr  Q24H


 IVPB


   11/30/18 12:00


 12/5/18 11:59  11/30/18 11:48


 


 


 Heparin Sodium


  (Porcine)


  (Heparin 5000


 units/ml)  5,000 units  EVERY 12  HOURS


 SUBQ


   11/29/18 21:00


 12/27/18 20:59  11/30/18 09:12


 


 


 Iron Sucrose 100


 mg/Sodium Chloride  60 ml @ 


 240 mls/hr  BEDTIME


 IV


   11/29/18 21:00


 12/3/18 21:14  11/29/18 21:00


 


 


 Levothyroxine


 Sodium


  (Synthroid)  25 mcg  DAILY@0630


 GT


   11/30/18 06:30


 12/29/18 06:29  11/30/18 06:38


 


 


 Metoclopramide HCl


  (Reglan)  10 mg  Q6H  PRN


 IVP


 Nausea & Vomiting  11/29/18 21:30


 12/29/18 21:29   


 


 


 Pantoprazole


  (Protonix)  40 mg  EVERY 12  HOURS


 IVP


   11/29/18 21:00


 12/27/18 20:59  11/30/18 09:11


 


 


 Sodium Phosphate


 30 mm/Sodium


 Chloride  285 ml @ 


 47.5 mls/hr  ONCE  ONCE


 IVPB


   11/30/18 12:30


 11/30/18 18:29  11/30/18 13:38


 


 


 Tramadol HCl


  (Ultram)  25 mg  Q8H


 GT


   11/30/18 02:30


 12/4/18 18:29  11/30/18 09:38


 

















Ricky Espinal M.D. Nov 30, 2018 15:40

## 2018-12-01 VITALS — SYSTOLIC BLOOD PRESSURE: 122 MMHG | DIASTOLIC BLOOD PRESSURE: 63 MMHG

## 2018-12-01 VITALS — DIASTOLIC BLOOD PRESSURE: 71 MMHG | SYSTOLIC BLOOD PRESSURE: 111 MMHG

## 2018-12-01 VITALS — DIASTOLIC BLOOD PRESSURE: 65 MMHG | SYSTOLIC BLOOD PRESSURE: 134 MMHG

## 2018-12-01 VITALS — SYSTOLIC BLOOD PRESSURE: 127 MMHG | DIASTOLIC BLOOD PRESSURE: 67 MMHG

## 2018-12-01 VITALS — DIASTOLIC BLOOD PRESSURE: 71 MMHG | SYSTOLIC BLOOD PRESSURE: 115 MMHG

## 2018-12-01 LAB
ADD MANUAL DIFF: NO
ALBUMIN SERPL-MCNC: 2.1 G/DL (ref 3.4–5)
ALBUMIN/GLOB SERPL: 0.6 {RATIO} (ref 1–2.7)
ALP SERPL-CCNC: 117 U/L (ref 46–116)
ALT SERPL-CCNC: 63 U/L (ref 12–78)
ANION GAP SERPL CALC-SCNC: 4 MMOL/L (ref 5–15)
AST SERPL-CCNC: 67 U/L (ref 15–37)
BASOPHILS NFR BLD AUTO: 0.8 % (ref 0–2)
BILIRUB SERPL-MCNC: 0.6 MG/DL (ref 0.2–1)
BUN SERPL-MCNC: 25 MG/DL (ref 7–18)
CALCIUM SERPL-MCNC: 7.8 MG/DL (ref 8.5–10.1)
CHLORIDE SERPL-SCNC: 103 MMOL/L (ref 98–107)
CO2 SERPL-SCNC: 30 MMOL/L (ref 21–32)
CREAT SERPL-MCNC: 0.4 MG/DL (ref 0.55–1.3)
EOSINOPHIL NFR BLD AUTO: 3.6 % (ref 0–3)
ERYTHROCYTE [DISTWIDTH] IN BLOOD BY AUTOMATED COUNT: 10.3 % (ref 11.6–14.8)
GLOBULIN SER-MCNC: 3.6 G/DL
HCT VFR BLD CALC: 26.2 % (ref 42–52)
HGB BLD-MCNC: 9.1 G/DL (ref 14.2–18)
LYMPHOCYTES NFR BLD AUTO: 21.7 % (ref 20–45)
MCV RBC AUTO: 93 FL (ref 80–99)
MONOCYTES NFR BLD AUTO: 14.5 % (ref 1–10)
NEUTROPHILS NFR BLD AUTO: 59.4 % (ref 45–75)
PLATELET # BLD: 154 K/UL (ref 150–450)
POTASSIUM SERPL-SCNC: 4 MMOL/L (ref 3.5–5.1)
RBC # BLD AUTO: 2.81 M/UL (ref 4.7–6.1)
SODIUM SERPL-SCNC: 136 MMOL/L (ref 136–145)
WBC # BLD AUTO: 6.7 K/UL (ref 4.8–10.8)

## 2018-12-01 RX ADMIN — ERTAPENEM SODIUM SCH MLS/HR: 1 INJECTION, POWDER, LYOPHILIZED, FOR SOLUTION INTRAMUSCULAR; INTRAVENOUS at 11:14

## 2018-12-01 RX ADMIN — PANTOPRAZOLE SODIUM SCH MG: 40 INJECTION, POWDER, FOR SOLUTION INTRAVENOUS at 09:48

## 2018-12-01 RX ADMIN — HEPARIN SODIUM SCH UNITS: 5000 INJECTION INTRAVENOUS; SUBCUTANEOUS at 09:49

## 2018-12-01 RX ADMIN — TRAMADOL HYDROCHLORIDE SCH MG: 50 TABLET, FILM COATED ORAL at 02:38

## 2018-12-01 RX ADMIN — TRAMADOL HYDROCHLORIDE SCH MG: 50 TABLET, FILM COATED ORAL at 09:48

## 2018-12-01 RX ADMIN — LEVOTHYROXINE SODIUM SCH MCG: 25 TABLET ORAL at 06:06

## 2018-12-01 NOTE — PULMONOLOGY PROGRESS NOTE
Assessment/Plan


Assessment/Plan


ASSESSMENT


Sepsis


Aspiration pneumonia with E coli ESBL 


Severe protein calorie malnutrition


Anemia


Dysphagia, PEG tube  


Dementia


Abdominal wall cellulitis


Elevated LFT 


Hepatitis C 


Anemia


Hyponatremia- resolved 





PLAN OF CARE


MS floor


titrate O2    to keep pulse ox above 92% 


pulmonary toilet


abx as per ID recs


sputum cx + E coli ESBL 


blood and urine cx negative, influenza screen negative 


f/up with CXR  


DVT/GI prophylaxis 


strict aspiration precaution 


G-tube feeding, monitor tolerance 


local wound care for abdominal wall cellulitis 


elevated TSH, started on low-dose levothyroxine 


status post IV fluids 


monitor  renal parameters ,  lytes and correct  lytes prn


Na  up to normal 


monitor H&H with goal to keep Hgb above 7 ; anemia w/up noted


s/p  post Venofer 1 


LFT trending down 


hepatitis panel +hepatitis C 


bowel regimen 


supportive care 


DNR/DNI status 


Recommend end of life care/palliative care   





case discussed and evaluated by supervising physician





Subjective


Allergies:  


Coded Allergies:  


     NO KNOWN DRUG ALLERGIES (Unverified  Allergy, Unknown, 1/28/16)





Objective





Last 24 Hour Vital Signs








  Date Time  Temp Pulse Resp B/P (MAP) Pulse Ox O2 Delivery O2 Flow Rate FiO2


 


12/1/18 04:00 98.0 81 18 115/71 (86) 100   


 


12/1/18 03:08 97.9       


 


12/1/18 00:20 97.9 81 18 111/71 (84) 100   


 


11/30/18 21:00      Nasal Cannula 3.0 


 


11/30/18 20:00 97.8 78 18 109/76 (87) 100   


 


11/30/18 16:00 98.4 84 20 120/68 (85) 97   


 


11/30/18 12:00 97.0 60 19 142/78 (99) 97   


 


11/30/18 09:00      Nasal Cannula 3.0 

















Intake and Output  


 


 11/30/18 12/1/18





 19:00 07:00


 


Intake Total 980.0 ml 310 ml


 


Output Total 200 ml 200 ml


 


Balance 780.0 ml 110 ml


 


  


 


Free Water 220 ml 50 ml


 


IV Total 280.0 ml 60 ml


 


Tube Feeding 480 ml 200 ml


 


Output Urine Total 200 ml 200 ml











Microbiology








 Date/Time


Source Procedure


Growth Status


 


 


 11/28/18 08:45


Sputum Expectorated Gram Stain - Final Complete


 


 11/28/18 08:45 Sputum Culture - Final


Escherichia Coli - Esbl


Usual Respiratory Shyla Complete








Laboratory Tests


12/1/18 05:20: 


White Blood Count 6.7, Red Blood Count 2.81L, Hemoglobin 9.1L, Hematocrit 26.2L

, Mean Corpuscular Volume 93, Mean Corpuscular Hemoglobin 32.3H, Mean 

Corpuscular Hemoglobin Concent 34.6, Red Cell Distribution Width 10.3L, 

Platelet Count 154, Mean Platelet Volume 6.8, Neutrophils (%) (Auto) 59.4, 

Lymphocytes (%) (Auto) 21.7, Monocytes (%) (Auto) 14.5H, Eosinophils (%) (Auto) 

3.6H, Basophils (%) (Auto) 0.8, Sodium Level 136, Potassium Level 4.0, Chloride 

Level 103, Carbon Dioxide Level 30, Anion Gap 4L, Blood Urea Nitrogen 25H, 

Creatinine 0.4L, Estimat Glomerular Filtration Rate , Glucose Level 125H, 

Calcium Level 7.8L, Total Bilirubin 0.6, Aspartate Amino Transf (AST/SGOT) 67H, 

Alanine Aminotransferase (ALT/SGPT) 63, Alkaline Phosphatase 117H, Total 

Protein 5.7L, Albumin 2.1L, Globulin 3.6, Albumin/Globulin Ratio 0.6L, 

Prealbumin [Pending]





Current Medications








 Medications


  (Trade)  Dose


 Ordered  Sig/Benita


 Route


 PRN Reason  Start Time


 Stop Time Status Last Admin


Dose Admin


 


 Acetaminophen


  (Tylenol)  650 mg  Q4H  PRN


 GT


 Mild Pain/Temp > 100.5  11/29/18 21:30


 12/27/18 21:29   


 


 


 Ertapenem 1 gm/


 Sodium Chloride  55 ml @ 


 110 mls/hr  Q24H


 IVPB


   11/30/18 12:00


 12/5/18 11:59  11/30/18 11:48


 


 


 Heparin Sodium


  (Porcine)


  (Heparin 5000


 units/ml)  5,000 units  EVERY 12  HOURS


 SUBQ


   11/29/18 21:00


 12/27/18 20:59  11/30/18 20:16


 


 


 Iron Sucrose 100


 mg/Sodium Chloride  60 ml @ 


 240 mls/hr  BEDTIME


 IV


   11/29/18 21:00


 12/3/18 21:14  11/30/18 20:15


 


 


 Levothyroxine


 Sodium


  (Synthroid)  25 mcg  DAILY@0630


 GT


   11/30/18 06:30


 12/29/18 06:29  12/1/18 06:06


 


 


 Metoclopramide HCl


  (Reglan)  10 mg  Q6H  PRN


 IVP


 Nausea & Vomiting  11/29/18 21:30


 12/29/18 21:29   


 


 


 Pantoprazole


  (Protonix)  40 mg  EVERY 12  HOURS


 IVP


   11/29/18 21:00


 12/27/18 20:59  11/30/18 20:15


 


 


 Tramadol HCl


  (Ultram)  25 mg  Q8H


 GT


   11/30/18 02:30


 12/4/18 18:29  12/1/18 02:38


 

















Greer Varela NP Dec 1, 2018 08:06

## 2018-12-01 NOTE — GENERAL PROGRESS NOTE
Assessment/Plan


Problem List:  


(1) Aspiration pneumonia


ICD Codes:  J69.0 - Pneumonitis due to inhalation of food and vomit


SNOMED:  613854785


(2) Hyponatremia


ICD Codes:  E87.1 - Hypo-osmolality and hyponatremia


SNOMED:  74025878


(3) Hypoalbuminemia


ICD Codes:  E88.09 - Other disorders of plasma-protein metabolism, not 

elsewhere classified


SNOMED:  421406137


(4) Anemia


ICD Codes:  D64.9 - Anemia, unspecified


SNOMED:  500532810


Status:  stable


Assessment/Plan


DC to ECF on bactrim





replaced mag phos K as needed


Antibiotics current IVANZ


pulm toilet


monitor labs lytes


per ID and pulm advise





Subjective


ROS Limited/Unobtainable:  No


Constitutional:  Reports: malaise


Allergies:  


Coded Allergies:  


     NO KNOWN DRUG ALLERGIES (Unverified  Allergy, Unknown, 1/28/16)





Objective





Last 24 Hour Vital Signs








  Date Time  Temp Pulse Resp B/P (MAP) Pulse Ox O2 Delivery O2 Flow Rate FiO2


 


12/1/18 12:03 97.2 77 18 134/65 (88) 99   


 


12/1/18 09:00      Nasal Cannula 3.0 


 


12/1/18 08:00 97.5 80 16 127/67 (87) 100   


 


12/1/18 04:00 98.0 81 18 115/71 (86) 100   


 


12/1/18 03:08 97.9       


 


12/1/18 00:20 97.9 81 18 111/71 (84) 100   


 


11/30/18 21:00      Nasal Cannula 3.0 


 


11/30/18 20:00 97.8 78 18 109/76 (87) 100   


 


11/30/18 16:00 98.4 84 20 120/68 (85) 97   











Current Medications








 Medications


  (Trade)  Dose


 Ordered  Sig/Benita


 Route


 PRN Reason  Start Time


 Stop Time Status Last Admin


Dose Admin


 


 Acetaminophen


  (Tylenol)  650 mg  Q4H  PRN


 GT


 Mild Pain/Temp > 100.5  11/29/18 21:30


 12/27/18 21:29   


 


 


 Ertapenem 1 gm/


 Sodium Chloride  55 ml @ 


 110 mls/hr  Q24H


 IVPB


   11/30/18 12:00


 12/5/18 11:59  12/1/18 11:14


 


 


 Heparin Sodium


  (Porcine)


  (Heparin 5000


 units/ml)  5,000 units  EVERY 12  HOURS


 SUBQ


   11/29/18 21:00


 12/27/18 20:59  12/1/18 09:49


 


 


 Iron Sucrose 100


 mg/Sodium Chloride  60 ml @ 


 240 mls/hr  BEDTIME


 IV


   11/29/18 21:00


 12/3/18 21:14  11/30/18 20:15


 


 


 Lansoprazole


  (Prevacid)  30 mg  BID


 GT


   12/1/18 18:00


 12/31/18 17:59   


 


 


 Levothyroxine


 Sodium


  (Synthroid)  25 mcg  DAILY@0630


 GT


   11/30/18 06:30


 12/29/18 06:29  12/1/18 06:06


 


 


 Metoclopramide HCl


  (Reglan)  10 mg  Q6H  PRN


 IVP


 Nausea & Vomiting  11/29/18 21:30


 12/29/18 21:29   


 


 


 Tramadol HCl


  (Ultram)  25 mg  Q8H


 GT


   11/30/18 02:30


 12/4/18 18:29  12/1/18 09:48


 


 


 Trimethoprim/


 Sulfamethoxazole


  (Bactrim-DS)  20 ml  EVERY 12  HOURS


 NG


   12/1/18 21:00


 12/8/18 20:59   


 














Intake and Output  


 


 11/30/18 12/1/18





 19:00 07:00


 


Intake Total 980.0 ml 310 ml


 


Output Total 200 ml 200 ml


 


Balance 780.0 ml 110 ml


 


  


 


Free Water 220 ml 50 ml


 


IV Total 280.0 ml 60 ml


 


Tube Feeding 480 ml 200 ml


 


Output Urine Total 200 ml 200 ml








Laboratory Tests


12/1/18 05:20: 


White Blood Count 6.7, Red Blood Count 2.81L, Hemoglobin 9.1L, Hematocrit 26.2L

, Mean Corpuscular Volume 93, Mean Corpuscular Hemoglobin 32.3H, Mean 

Corpuscular Hemoglobin Concent 34.6, Red Cell Distribution Width 10.3L, 

Platelet Count 154, Mean Platelet Volume 6.8, Neutrophils (%) (Auto) 59.4, 

Lymphocytes (%) (Auto) 21.7, Monocytes (%) (Auto) 14.5H, Eosinophils (%) (Auto) 

3.6H, Basophils (%) (Auto) 0.8, Sodium Level 136, Potassium Level 4.0, Chloride 

Level 103, Carbon Dioxide Level 30, Anion Gap 4L, Blood Urea Nitrogen 25H, 

Creatinine 0.4L, Estimat Glomerular Filtration Rate , Glucose Level 125H, 

Calcium Level 7.8L, Total Bilirubin 0.6, Aspartate Amino Transf (AST/SGOT) 67H, 

Alanine Aminotransferase (ALT/SGPT) 63, Alkaline Phosphatase 117H, Total 

Protein 5.7L, Albumin 2.1L, Globulin 3.6, Albumin/Globulin Ratio 0.6L, 

Prealbumin [Pending]


Height (Feet):  5


Height (Inches):  5.00


Weight (Pounds):  117


General Appearance:  no apparent distress


Cardiovascular:  normal rate


Respiratory/Chest:  decreased breath sounds


Abdomen:  soft


Objective


no other changes











Morales Hill MD Dec 1, 2018 12:56

## 2018-12-01 NOTE — INFECTIOUS DISEASES PROG NOTE
Assessment/Plan


Problems:  


(1) Aspiration pneumonia


Assessment & Plan:  due to ESBL producing E.coli , with left perihilar 

infiltrates , will treat with ertapenem for two weeks total . EOT 12/14/18


recommend aspiration precaution and keep HOB > 30 degree all the time 





(2) Sepsis


Assessment & Plan:  due to the above, blood culture is negative . 





(3) Abdominal wall cellulitis


Assessment & Plan:  improving, already on wide spectrum antibiotics , continue 

local wound care .





(4) Fever


Assessment & Plan:  due to the above , improving, continue tylenol and wide 

spectrum antibiotics 





(5) HCV antibody positive


Assessment & Plan:  rule out chronic infection, will order viral load to 

confirm 








Subjective


ROS Limited/Unobtainable:  Yes


Allergies:  


Coded Allergies:  


     NO KNOWN DRUG ALLERGIES (Unverified  Allergy, Unknown, 1/28/16)


Subjective


he was comfortable, lying in bed, contracted, open eyes spontaneously . 

afebrile , no SOB, no wheezing





Objective


Vital Signs





Last 24 Hour Vital Signs








  Date Time  Temp Pulse Resp B/P (MAP) Pulse Ox O2 Delivery O2 Flow Rate FiO2


 


12/1/18 12:03 97.2 77 18 134/65 (88) 99   


 


12/1/18 09:00      Nasal Cannula 3.0 


 


12/1/18 08:00 97.5 80 16 127/67 (87) 100   


 


12/1/18 04:00 98.0 81 18 115/71 (86) 100   


 


12/1/18 03:08 97.9       


 


12/1/18 00:20 97.9 81 18 111/71 (84) 100   


 


11/30/18 21:00      Nasal Cannula 3.0 


 


11/30/18 20:00 97.8 78 18 109/76 (87) 100   


 


11/30/18 16:00 98.4 84 20 120/68 (85) 97   








Height (Feet):  5


Height (Inches):  5.00


Weight (Pounds):  117


General Appearance:  WD/WN, no acute distress, cachetic


HEENT:  normocephalic, atraumatic, anicteric, mucous membranes moist, supple, 

no JVD


Respiratory/Chest:  chest wall non-tender, no respiratory distress, no 

accessory muscle use, decreased breath sounds, crackles/rales


Cardiovascular:  normal peripheral pulses, normal rate, regular rhythm, no 

gallop/murmur, no JVD


Abdomen:  normal bowel sounds, soft, non tender, no organomegaly, non distended

, no mass, no scars


Extremities:  no cyanosis, no clubbing


Skin:  no rash, no lesions, no ulcers


Neurologic/Psychiatric:  alert, oriented x 3, responsive


Lymphatic:  no neck adenopathy, no groin adenopathy


Musculoskeletal:  normal muscle bulk, no effusion





Laboratory Tests








Test


  12/1/18


05:20


 


White Blood Count


  6.7 K/UL


(4.8-10.8)


 


Red Blood Count


  2.81 M/UL


(4.70-6.10)  L


 


Hemoglobin


  9.1 G/DL


(14.2-18.0)  L


 


Hematocrit


  26.2 %


(42.0-52.0)  L


 


Mean Corpuscular Volume 93 FL (80-99)  


 


Mean Corpuscular Hemoglobin


  32.3 PG


(27.0-31.0)  H


 


Mean Corpuscular Hemoglobin


Concent 34.6 G/DL


(32.0-36.0)


 


Red Cell Distribution Width


  10.3 %


(11.6-14.8)  L


 


Platelet Count


  154 K/UL


(150-450)


 


Mean Platelet Volume


  6.8 FL


(6.5-10.1)


 


Neutrophils (%) (Auto)


  59.4 %


(45.0-75.0)


 


Lymphocytes (%) (Auto)


  21.7 %


(20.0-45.0)


 


Monocytes (%) (Auto)


  14.5 %


(1.0-10.0)  H


 


Eosinophils (%) (Auto)


  3.6 %


(0.0-3.0)  H


 


Basophils (%) (Auto)


  0.8 %


(0.0-2.0)


 


Sodium Level


  136 MMOL/L


(136-145)


 


Potassium Level


  4.0 MMOL/L


(3.5-5.1)


 


Chloride Level


  103 MMOL/L


()


 


Carbon Dioxide Level


  30 MMOL/L


(21-32)


 


Anion Gap


  4 mmol/L


(5-15)  L


 


Blood Urea Nitrogen


  25 mg/dL


(7-18)  H


 


Creatinine


  0.4 MG/DL


(0.55-1.30)  L


 


Estimat Glomerular Filtration


Rate  mL/min (>60)  


 


 


Glucose Level


  125 MG/DL


()  H


 


Calcium Level


  7.8 MG/DL


(8.5-10.1)  L


 


Total Bilirubin


  0.6 MG/DL


(0.2-1.0)


 


Aspartate Amino Transf


(AST/SGOT) 67 U/L (15-37)


H


 


Alanine Aminotransferase


(ALT/SGPT) 63 U/L (12-78)


 


 


Alkaline Phosphatase


  117 U/L


()  H


 


Total Protein


  5.7 G/DL


(6.4-8.2)  L


 


Albumin


  2.1 G/DL


(3.4-5.0)  L


 


Globulin 3.6 g/dL  


 


Albumin/Globulin Ratio


  0.6 (1.0-2.7)


L


 


Prealbumin Pending  











Current Medications








 Medications


  (Trade)  Dose


 Ordered  Sig/Benita


 Route


 PRN Reason  Start Time


 Stop Time Status Last Admin


Dose Admin


 


 Acetaminophen


  (Tylenol)  650 mg  Q4H  PRN


 GT


 Mild Pain/Temp > 100.5  11/29/18 21:30


 12/27/18 21:29   


 


 


 Albuterol/


 Ipratropium


  (Albuterol/


 Ipratropium)  3 ml  Q4H  PRN


 HHN


 Shortness of Breath  12/1/18 13:30


 12/6/18 13:29   


 


 


 Ertapenem 1 gm/


 Sodium Chloride  55 ml @ 


 110 mls/hr  Q24H


 IVPB


   11/30/18 12:00


 12/5/18 11:59  12/1/18 11:14


 


 


 Heparin Sodium


  (Porcine)


  (Heparin 5000


 units/ml)  5,000 units  EVERY 12  HOURS


 SUBQ


   11/29/18 21:00


 12/27/18 20:59  12/1/18 09:49


 


 


 Iron Sucrose 100


 mg/Sodium Chloride  60 ml @ 


 240 mls/hr  BEDTIME


 IV


   11/29/18 21:00


 12/3/18 21:14  11/30/18 20:15


 


 


 Lansoprazole


  (Prevacid)  30 mg  BID


 GT


   12/1/18 18:00


 12/31/18 17:59   


 


 


 Levothyroxine


 Sodium


  (Synthroid)  25 mcg  DAILY@0630


 GT


   11/30/18 06:30


 12/29/18 06:29  12/1/18 06:06


 


 


 Metoclopramide HCl


  (Reglan)  10 mg  Q6H  PRN


 IVP


 Nausea & Vomiting  11/29/18 21:30


 12/29/18 21:29   


 


 


 Tramadol HCl


  (Ultram)  25 mg  Q8H


 GT


   11/30/18 02:30


 12/4/18 18:29  12/1/18 09:48


 


 


 Trimethoprim/


 Sulfamethoxazole


  (Bactrim-DS)  20 ml  EVERY 12  HOURS


 NG


   12/1/18 21:00


 12/8/18 20:59   


 

















Ricky Espinal M.D. Dec 1, 2018 15:07

## 2018-12-01 NOTE — DISCHARGE INSTRUCTIONS
Discharge Instructions


Discharge Instructions


Follow up with:  myself at [facility]


Diet:  other - GT feeding


Special Instructions





routin skin care-


head elevation 


aspiration percautions


DNR DNI





For Congestive Heart Failure


Reminder


Report to your physician any weight gain of 5 pounds or more in one week.











Morales Hill MD Dec 1, 2018 13:03

## 2018-12-01 NOTE — GENERAL SURGERY PROGRESS NOTE
General Surgery-Progress Note


Subjective


Additional Comments


no acute events. doing well.





Objective





Last 24 Hour Vital Signs








  Date Time  Temp Pulse Resp B/P (MAP) Pulse Ox O2 Delivery O2 Flow Rate FiO2


 


12/1/18 12:03 97.2 77 18 134/65 (88) 99   


 


12/1/18 09:00      Nasal Cannula 3.0 


 


12/1/18 08:00 97.5 80 16 127/67 (87) 100   


 


12/1/18 04:00 98.0 81 18 115/71 (86) 100   


 


12/1/18 03:08 97.9       


 


12/1/18 00:20 97.9 81 18 111/71 (84) 100   


 


11/30/18 21:00      Nasal Cannula 3.0 


 


11/30/18 20:00 97.8 78 18 109/76 (87) 100   


 


11/30/18 16:00 98.4 84 20 120/68 (85) 97   








I&O











Intake and Output  


 


 11/30/18 12/1/18





 19:00 07:00


 


Intake Total 980.0 ml 310 ml


 


Output Total 200 ml 200 ml


 


Balance 780.0 ml 110 ml


 


  


 


Free Water 220 ml 50 ml


 


IV Total 280.0 ml 60 ml


 


Tube Feeding 480 ml 200 ml


 


Output Urine Total 200 ml 200 ml








Dressing:  other


Wound:  other


Drains:  other


Cardiovascular:  RSR


Respiratory:  clear


Abdomen:  soft, flat, present bowel sounds


Extremities:  other





Laboratory Tests








Test


  12/1/18


05:20


 


White Blood Count


  6.7 K/UL


(4.8-10.8)


 


Red Blood Count


  2.81 M/UL


(4.70-6.10)  L


 


Hemoglobin


  9.1 G/DL


(14.2-18.0)  L


 


Hematocrit


  26.2 %


(42.0-52.0)  L


 


Mean Corpuscular Volume 93 FL (80-99)  


 


Mean Corpuscular Hemoglobin


  32.3 PG


(27.0-31.0)  H


 


Mean Corpuscular Hemoglobin


Concent 34.6 G/DL


(32.0-36.0)


 


Red Cell Distribution Width


  10.3 %


(11.6-14.8)  L


 


Platelet Count


  154 K/UL


(150-450)


 


Mean Platelet Volume


  6.8 FL


(6.5-10.1)


 


Neutrophils (%) (Auto)


  59.4 %


(45.0-75.0)


 


Lymphocytes (%) (Auto)


  21.7 %


(20.0-45.0)


 


Monocytes (%) (Auto)


  14.5 %


(1.0-10.0)  H


 


Eosinophils (%) (Auto)


  3.6 %


(0.0-3.0)  H


 


Basophils (%) (Auto)


  0.8 %


(0.0-2.0)


 


Sodium Level


  136 MMOL/L


(136-145)


 


Potassium Level


  4.0 MMOL/L


(3.5-5.1)


 


Chloride Level


  103 MMOL/L


()


 


Carbon Dioxide Level


  30 MMOL/L


(21-32)


 


Anion Gap


  4 mmol/L


(5-15)  L


 


Blood Urea Nitrogen


  25 mg/dL


(7-18)  H


 


Creatinine


  0.4 MG/DL


(0.55-1.30)  L


 


Estimat Glomerular Filtration


Rate  mL/min (>60)  


 


 


Glucose Level


  125 MG/DL


()  H


 


Calcium Level


  7.8 MG/DL


(8.5-10.1)  L


 


Total Bilirubin


  0.6 MG/DL


(0.2-1.0)


 


Aspartate Amino Transf


(AST/SGOT) 67 U/L (15-37)


H


 


Alanine Aminotransferase


(ALT/SGPT) 63 U/L (12-78)


 


 


Alkaline Phosphatase


  117 U/L


()  H


 


Total Protein


  5.7 G/DL


(6.4-8.2)  L


 


Albumin


  2.1 G/DL


(3.4-5.0)  L


 


Globulin 3.6 g/dL  


 


Albumin/Globulin Ratio


  0.6 (1.0-2.7)


L


 


Prealbumin Pending  











Plan


Problems:  


(1) Severe protein-calorie malnutrition


Assessment & Plan:  Will need nutritional optimization for proper wound healing


will check labs 


nutrition consult


increase intake


thank you 





(2) Decubitus ulcer


Assessment & Plan:  Patient present with multiple pressure injuries. 


Maroon discoloration noted to L shoulder (L)3cm x (W)5.5cm 


Full thickness pressure injury to thoracic spine with 100% slough and red 

borders(L)0.5cm x (W)0.7cm .Non-blanching erythema periwound. 


Full thickness pressure injury to sacrum with 50% slough (L)2cm x (W)

1.5cm.Marginal erythema at borders .Non-blanching erythema periwound. 


Scrotum red but is intact. 


Resolving pressure injury noted to medial L tibia. 


Resolving pressure injury noted to dorsum R foot , pink and dry.  


R 1st metatarsal head,lateral and plantar aspect fluctuant and maroon in color. 


R hallux maroon and fluctuant. 


Both heels are boggy but blanchable.


All wounds present upon admission and will be cared for during hospital stay 





Tx.Plan:


Cleanse wounds sacrum and Thoracic area with Saline.Apply Therahoney gel. Cover 

with Optifoam drsg Daily and and prn.


           


Apply Cavilon to L shoulder.Cover with Optifoam drsg .Change Daily and prn.


           


Apply Cavilon wipes to R 1st metatarsal and R hallux.Cover with Optifoam drsg 

.Change every 7 days and prn.


           


Cavilon to both heels .Cover with Optifoam drsg .Change every 7days and prn


           


Reposition at minimum every 2hours or as tolerated.


           


Surface Support mattress. 





Additional Comments


okay to d/c from surgical standpoint


outpatient nutrition and wound care











Carl Muñoz Dec 1, 2018 15:26

## 2018-12-04 NOTE — DISCHARGE SUMMARY
Discharge Summary


Discharge Summary


_


DATE OF ADMISSION: 11/27/2018





DATE OF DISCHARGE: 12/01/2018





REASON FOR ADMISSION: 


84 years old male with DNR/DNI status, with past medical history of dementia, 

hypothyroidism, dysphagia, G-tube, malnutrition ,anemia, hypertension,  

multiple pressure ulcers, resident of skilled nursing facility, was recently  

taken off hospice.  


Patient was transferred to emergency room for evaluation due to fever and 

lethargy.  


Upon evaluation vital signs revealed fever and mild tachycardia.  


Laboratory data showed no leukocytosis ( leukocytosis next day),   hemoglobin 

12.3, sodium 127, BUN  24, creatinine 0.5;  elevated  and ALT 89.  


Albumin 2.5.  


Urinalysis with pyuria and occasional bacteria.


Chest x-ray revealed  left perihilar infiltrate.  Troponin negative, EKG 

revealed normal sinus rhythm, no acute ischemic changes .


Patient admitted with diagnoses of pneumonia , possible UTI, hyponatremia ,

dysphagia ,G-tube, malnutrition, anemia ,dementia, elevated liver enzymes. 


 


CONSULTANTS:


pulmonary Dr. Parker 


ID specialist Dr. Espinal


surgery Dr. Muñoz 


 


 


[facility] COURSE: 


Patient admitted and started on IV hydration.  


G tube feeding resumed.  


Patient started on empiric antibiotics.  


DVT and GI prophylaxis with PPI provided.  


Symptomatic treatment with Tylenol for pain and fever and Reglan as needed for 

nausea and vomiting  was on board.  


Pulmonary ID consults  closely followed


Blood  and urine culture were negative.  


Influenza screen test was negative.  


Sputum culture revealed  Escherichia coli ESBL.


Supplemental oxygen provided as needed to keep pulse oximetry above 92%.  


Pulmonary toilet provided around the clock and as needed.  


Strict aspiration precautions were maintained.  


Patient was able to tolerate G-tube feeding.  


Local wound care provided for abdominal wall cellulitis.  


Levothyroxine continued.  


Patient initially received IV fluids.  Renal parameters and  electrolytes were 

closely monitored ,electrolytes corrected as needed.  


Sodium up to normal.  


LFT trending down.  Hepatitis panel positive for hepatitis C.   


Hemoglobin and hematocrit were closely monitored with goal to keep hemoglobin 

above 7.  


Anemia  workup revealed anemia of chronic disease.  


Patient received 1 dose of Venofer. 


Hemoglobin and hematocrit  at baseline  prior to discharge.   





Wound care provided as per surgeon recommendations for multiple pressure ulcers

, present on admission. 


No surgical intervention was necessary at this time. 


Nutritionist recommendations implemented in plan of care .


Bowel regimen instituted .


Supportive care provided.


Pain management was addressed .


Patient stabilized and was ready for transfer back to skilled nursing facility.


Recommended end-of-life care/palliative care








FINAL DIAGNOSES: 


sepsis 


aspiration pneumonia with Escherichia coli ESBL 


abdominal wall cellulitis 


severe protein calorie malnutrition 


anemia of chronic disease 


dysphagia /PEG 


dementia 


elevated LFT 


hepatitis C 


hyponatremia- resolved


multiple pressure ulcers, present on admission





DISCHARGE MEDICATIONS:


See Medication Reconciliation list.





DISCHARGE INSTRUCTIONS:


Patient was discharged to the skilled nursing facility. 


Follow up with medical doctor at the facility.











Greer Varela NP Dec 4, 2018 08:07

## 2024-12-05 NOTE — EMERGENCY ROOM REPORT
History of Present Illness


General


Chief Complaint:  Malfunctioning Gastric Tube


Source:  Medical Record, EMS





Present Illness


HPI


Patient presents with request of the feeding tube confirmation





There was a questionable reading on the x-ray as an outpatient


It was unclear the patient had appropriate placement of the feeding tube





No reports of vomiting or diarrhea patient has been treated as an outpatient 

nursing facility for pneumonia





History of present illness is limited as the patient himself is nonverbal and 

appears chronically debilitated


Allergies:  


Coded Allergies:  


     NO KNOWN DRUG ALLERGIES (Unverified  Allergy, Unknown, 1/28/16)





Patient History


Past Medical History:  see triage record


Pertinent Family History:  none


Reviewed Nursing Documentation:  PMH: Agreed, PSxH: Agreed





Nursing Documentation-PMH


Past Medical History:  No History, Except For


Hx Cardiac Problems:  Yes - anemia, angina,CAD


Hx Hypertension:  Yes


Hx Pacemaker:  No


Hx Asthma:  No


Hx COPD:  No


Hx Diabetes:  No


Hx Cancer:  No


Hx Gastrointestinal Problems:  Yes - Dysphagia, G-tube


Hx Dialysis:  No


Hx Neurological Problems:  Yes


Hx Cerebrovascular Accident:  Yes - TIA


Hx Transient Ischemic Attacks:  Yes - no residual


Hx Dementia:  Yes


Hx Seizures:  No


Hx Spinal Cord Injury:  Yes - spinal stenosis


Hx Dysphasia:  Yes


Hx Weakness:  Yes - generalized muscle weakness


Hx Neurologic Surgery:  No


Hx Brain Shunt:  No





Review of Systems


All Other Systems:  limited - Other than the ones mentioned in the history of 

present illness all others are reviewed however they do stay limited due to the 

patient's mental status





Physical Exam





Vital Signs








  Date Time  Temp Pulse Resp B/P Pulse Ox O2 Delivery O2 Flow Rate FiO2


 


1/1/17 09:56 96.8 52 22 86/57 99 Nasal Cannula 2.0 








Sp02 EP Interpretation:  reviewed, normal


General Appearance:  no apparent distress


Head:  normocephalic, atraumatic


Eyes:  bilateral eye PERRL


ENT:  normal pharynx, no angioedema


Neck:  supple


Respiratory:  lungs clear, no rhonchi, crackles - mild crackles in both lower 

lobes


Cardiovascular #1:  regular rate, rhythm


Gastrointestinal:  soft, no mass


Neurologic:  responsive - To physical stimuli


Skin:  no rash, warm/dry





Medical Decision Making


Diagnostic Impression:  


 Primary Impression:  


 Feeding by G-tube


 Additional Impressions:  


 G tube feedings


 Encounter for feeding tube placement


ER Course


The patient appears to have a feeding tube through the stoma


This was evaluated further balloon was deflated


The feeding tube itself was reevaluated and placed into the stoma without any 

resistance


The balloon was inflated


And tapedown at this time





KUB Gastrografin reveals intraluminal findings no obvious extravasation


And the patient is stable for close outpatient nursing home followup


Chest X-Ray Diagnostic Results


Number of Views:  1





Other X-Ray Diagnostic Results


Other X-Ray Diagnostic Results :  


   EP Interpretation:  Yes


   Findings:  no fractures, no dislocation, no soft tissue swelling, other - 

Contrast appears intraluminal no obvious extravasation,





Last Vital Signs








  Date Time  Temp Pulse Resp B/P Pulse Ox O2 Delivery O2 Flow Rate FiO2


 


1/1/17 11:00  82 18 132/65 95 Nasal Cannula 2.0 


 


1/1/17 09:56 96.8       








Status:  improved


Disposition:  [facility]


Condition:  Improved


Referrals:  


NON PHYSICIAN (PCP)











BEN CURRIE D.O. Jan 1, 2017 12:22 Pt medicated per MAR. Fluid bolus initiated on pressure bag.